# Patient Record
Sex: FEMALE | Race: BLACK OR AFRICAN AMERICAN | Employment: FULL TIME | URBAN - METROPOLITAN AREA
[De-identification: names, ages, dates, MRNs, and addresses within clinical notes are randomized per-mention and may not be internally consistent; named-entity substitution may affect disease eponyms.]

---

## 2022-02-18 ENCOUNTER — APPOINTMENT (OUTPATIENT)
Dept: GENERAL RADIOLOGY | Age: 57
DRG: 291 | End: 2022-02-18
Attending: EMERGENCY MEDICINE
Payer: COMMERCIAL

## 2022-02-18 ENCOUNTER — HOSPITAL ENCOUNTER (INPATIENT)
Age: 57
LOS: 3 days | Discharge: HOME | DRG: 291 | End: 2022-02-21
Attending: EMERGENCY MEDICINE | Admitting: HOSPITALIST
Payer: COMMERCIAL

## 2022-02-18 ENCOUNTER — APPOINTMENT (OUTPATIENT)
Dept: CT IMAGING | Age: 57
DRG: 291 | End: 2022-02-18
Attending: EMERGENCY MEDICINE
Payer: COMMERCIAL

## 2022-02-18 DIAGNOSIS — I16.9 HYPERTENSIVE CRISIS: ICD-10-CM

## 2022-02-18 DIAGNOSIS — I50.9 NEW ONSET OF CONGESTIVE HEART FAILURE (HCC): Primary | ICD-10-CM

## 2022-02-18 DIAGNOSIS — R77.8 ELEVATED TROPONIN: ICD-10-CM

## 2022-02-18 PROBLEM — I50.23 SYSTOLIC CHF, ACUTE ON CHRONIC (HCC): Status: ACTIVE | Noted: 2022-02-18

## 2022-02-18 LAB
AMPHET UR QL SCN: NEGATIVE
ANION GAP SERPL CALC-SCNC: 8 MMOL/L (ref 3–18)
APPEARANCE UR: CLEAR
ATRIAL RATE: 111 BPM
BACTERIA URNS QL MICRO: ABNORMAL /HPF
BARBITURATES UR QL SCN: NEGATIVE
BASOPHILS # BLD: 0 K/UL (ref 0–0.1)
BASOPHILS NFR BLD: 0 % (ref 0–2)
BENZODIAZ UR QL: NEGATIVE
BILIRUB UR QL: NEGATIVE
BNP SERPL-MCNC: 1007 PG/ML (ref 0–900)
BUN SERPL-MCNC: 15 MG/DL (ref 7–18)
BUN/CREAT SERPL: 22 (ref 12–20)
CALCIUM SERPL-MCNC: 9.7 MG/DL (ref 8.5–10.1)
CALCULATED P AXIS, ECG09: 66 DEGREES
CALCULATED R AXIS, ECG10: 8 DEGREES
CALCULATED T AXIS, ECG11: 20 DEGREES
CANNABINOIDS UR QL SCN: NEGATIVE
CHLORIDE SERPL-SCNC: 109 MMOL/L (ref 100–111)
CK SERPL-CCNC: 147 U/L (ref 26–192)
CO2 SERPL-SCNC: 23 MMOL/L (ref 21–32)
COCAINE UR QL SCN: NEGATIVE
COLOR UR: YELLOW
CREAT SERPL-MCNC: 0.68 MG/DL (ref 0.6–1.3)
DIAGNOSIS, 93000: NORMAL
DIFFERENTIAL METHOD BLD: ABNORMAL
EOSINOPHIL # BLD: 0.1 K/UL (ref 0–0.4)
EOSINOPHIL NFR BLD: 1 % (ref 0–5)
EPITH CASTS URNS QL MICRO: ABNORMAL /LPF (ref 0–5)
ERYTHROCYTE [DISTWIDTH] IN BLOOD BY AUTOMATED COUNT: 15.4 % (ref 11.6–14.5)
GLUCOSE SERPL-MCNC: 116 MG/DL (ref 74–99)
GLUCOSE UR STRIP.AUTO-MCNC: NEGATIVE MG/DL
HCG SERPL QL: NEGATIVE
HCT VFR BLD AUTO: 38.9 % (ref 35–45)
HDSCOM,HDSCOM: NORMAL
HGB BLD-MCNC: 12 G/DL (ref 12–16)
HGB UR QL STRIP: NEGATIVE
IMM GRANULOCYTES # BLD AUTO: 0 K/UL (ref 0–0.04)
IMM GRANULOCYTES NFR BLD AUTO: 0 % (ref 0–0.5)
KETONES UR QL STRIP.AUTO: NEGATIVE MG/DL
LEUKOCYTE ESTERASE UR QL STRIP.AUTO: ABNORMAL
LYMPHOCYTES # BLD: 1.9 K/UL (ref 0.9–3.6)
LYMPHOCYTES NFR BLD: 24 % (ref 21–52)
MCH RBC QN AUTO: 27 PG (ref 24–34)
MCHC RBC AUTO-ENTMCNC: 30.8 G/DL (ref 31–37)
MCV RBC AUTO: 87.6 FL (ref 78–100)
METHADONE UR QL: NEGATIVE
MONOCYTES # BLD: 0.6 K/UL (ref 0.05–1.2)
MONOCYTES NFR BLD: 8 % (ref 3–10)
NEUTS SEG # BLD: 5.2 K/UL (ref 1.8–8)
NEUTS SEG NFR BLD: 66 % (ref 40–73)
NITRITE UR QL STRIP.AUTO: NEGATIVE
NRBC # BLD: 0 K/UL (ref 0–0.01)
NRBC BLD-RTO: 0 PER 100 WBC
OPIATES UR QL: NEGATIVE
P-R INTERVAL, ECG05: 150 MS
PCP UR QL: NEGATIVE
PH UR STRIP: 7.5 [PH] (ref 5–8)
PLATELET # BLD AUTO: 291 K/UL (ref 135–420)
PMV BLD AUTO: 11.4 FL (ref 9.2–11.8)
POTASSIUM SERPL-SCNC: 3.5 MMOL/L (ref 3.5–5.5)
PROT UR STRIP-MCNC: ABNORMAL MG/DL
Q-T INTERVAL, ECG07: 388 MS
QRS DURATION, ECG06: 90 MS
QTC CALCULATION (BEZET), ECG08: 527 MS
RBC # BLD AUTO: 4.44 M/UL (ref 4.2–5.3)
RBC #/AREA URNS HPF: NEGATIVE /HPF (ref 0–5)
SODIUM SERPL-SCNC: 140 MMOL/L (ref 136–145)
SP GR UR REFRACTOMETRY: 1.02 (ref 1–1.03)
TROPONIN-HIGH SENSITIVITY: 95 NG/L (ref 0–54)
TSH SERPL DL<=0.05 MIU/L-ACNC: 2.69 UIU/ML (ref 0.36–3.74)
UROBILINOGEN UR QL STRIP.AUTO: 1 EU/DL (ref 0.2–1)
VENTRICULAR RATE, ECG03: 111 BPM
WBC # BLD AUTO: 7.8 K/UL (ref 4.6–13.2)
WBC URNS QL MICRO: ABNORMAL /HPF (ref 0–5)

## 2022-02-18 PROCEDURE — 84703 CHORIONIC GONADOTROPIN ASSAY: CPT

## 2022-02-18 PROCEDURE — 84484 ASSAY OF TROPONIN QUANT: CPT

## 2022-02-18 PROCEDURE — 83880 ASSAY OF NATRIURETIC PEPTIDE: CPT

## 2022-02-18 PROCEDURE — 74011250636 HC RX REV CODE- 250/636: Performed by: HOSPITALIST

## 2022-02-18 PROCEDURE — 80048 BASIC METABOLIC PNL TOTAL CA: CPT

## 2022-02-18 PROCEDURE — 71275 CT ANGIOGRAPHY CHEST: CPT

## 2022-02-18 PROCEDURE — 84443 ASSAY THYROID STIM HORMONE: CPT

## 2022-02-18 PROCEDURE — 81001 URINALYSIS AUTO W/SCOPE: CPT

## 2022-02-18 PROCEDURE — 74011250637 HC RX REV CODE- 250/637: Performed by: HOSPITALIST

## 2022-02-18 PROCEDURE — 65660000000 HC RM CCU STEPDOWN

## 2022-02-18 PROCEDURE — 74011250636 HC RX REV CODE- 250/636: Performed by: EMERGENCY MEDICINE

## 2022-02-18 PROCEDURE — 74011000636 HC RX REV CODE- 636: Performed by: EMERGENCY MEDICINE

## 2022-02-18 PROCEDURE — 80307 DRUG TEST PRSMV CHEM ANLYZR: CPT

## 2022-02-18 PROCEDURE — 96375 TX/PRO/DX INJ NEW DRUG ADDON: CPT

## 2022-02-18 PROCEDURE — 85025 COMPLETE CBC W/AUTO DIFF WBC: CPT

## 2022-02-18 PROCEDURE — 96374 THER/PROPH/DIAG INJ IV PUSH: CPT

## 2022-02-18 PROCEDURE — 93005 ELECTROCARDIOGRAM TRACING: CPT

## 2022-02-18 PROCEDURE — 74011250637 HC RX REV CODE- 250/637: Performed by: FAMILY MEDICINE

## 2022-02-18 PROCEDURE — 71045 X-RAY EXAM CHEST 1 VIEW: CPT

## 2022-02-18 PROCEDURE — 82550 ASSAY OF CK (CPK): CPT

## 2022-02-18 PROCEDURE — 74011250637 HC RX REV CODE- 250/637: Performed by: EMERGENCY MEDICINE

## 2022-02-18 PROCEDURE — 99285 EMERGENCY DEPT VISIT HI MDM: CPT

## 2022-02-18 RX ORDER — ACETAMINOPHEN 325 MG/1
650 TABLET ORAL
Status: DISCONTINUED | OUTPATIENT
Start: 2022-02-18 | End: 2022-02-21 | Stop reason: HOSPADM

## 2022-02-18 RX ORDER — LABETALOL HCL 20 MG/4 ML
20 SYRINGE (ML) INTRAVENOUS ONCE
Status: COMPLETED | OUTPATIENT
Start: 2022-02-18 | End: 2022-02-18

## 2022-02-18 RX ORDER — METOPROLOL TARTRATE 50 MG/1
50 TABLET ORAL EVERY 12 HOURS
Status: DISCONTINUED | OUTPATIENT
Start: 2022-02-19 | End: 2022-02-19

## 2022-02-18 RX ORDER — FUROSEMIDE 10 MG/ML
40 INJECTION INTRAMUSCULAR; INTRAVENOUS
Status: COMPLETED | OUTPATIENT
Start: 2022-02-18 | End: 2022-02-18

## 2022-02-18 RX ORDER — GUAIFENESIN 100 MG/5ML
324 LIQUID (ML) ORAL
Status: COMPLETED | OUTPATIENT
Start: 2022-02-18 | End: 2022-02-18

## 2022-02-18 RX ORDER — LISINOPRIL 5 MG/1
10 TABLET ORAL DAILY
Status: DISCONTINUED | OUTPATIENT
Start: 2022-02-18 | End: 2022-02-20

## 2022-02-18 RX ORDER — ENOXAPARIN SODIUM 100 MG/ML
40 INJECTION SUBCUTANEOUS EVERY 24 HOURS
Status: DISCONTINUED | OUTPATIENT
Start: 2022-02-18 | End: 2022-02-21 | Stop reason: HOSPADM

## 2022-02-18 RX ORDER — METOPROLOL SUCCINATE 25 MG/1
25 TABLET, EXTENDED RELEASE ORAL DAILY
Status: DISCONTINUED | OUTPATIENT
Start: 2022-02-18 | End: 2022-02-18

## 2022-02-18 RX ORDER — FUROSEMIDE 10 MG/ML
40 INJECTION INTRAMUSCULAR; INTRAVENOUS 2 TIMES DAILY
Status: DISCONTINUED | OUTPATIENT
Start: 2022-02-18 | End: 2022-02-21

## 2022-02-18 RX ADMIN — FUROSEMIDE 40 MG: 10 INJECTION, SOLUTION INTRAMUSCULAR; INTRAVENOUS at 05:29

## 2022-02-18 RX ADMIN — LISINOPRIL 10 MG: 5 TABLET ORAL at 16:48

## 2022-02-18 RX ADMIN — METOPROLOL TARTRATE 50 MG: 50 TABLET, FILM COATED ORAL at 23:39

## 2022-02-18 RX ADMIN — IOPAMIDOL 75 ML: 755 INJECTION, SOLUTION INTRAVENOUS at 06:35

## 2022-02-18 RX ADMIN — METOPROLOL SUCCINATE 25 MG: 25 TABLET, EXTENDED RELEASE ORAL at 16:48

## 2022-02-18 RX ADMIN — FUROSEMIDE 40 MG: 10 INJECTION, SOLUTION INTRAMUSCULAR; INTRAVENOUS at 18:33

## 2022-02-18 RX ADMIN — LABETALOL HYDROCHLORIDE 20 MG: 5 INJECTION, SOLUTION INTRAVENOUS at 05:29

## 2022-02-18 RX ADMIN — ACETAMINOPHEN 650 MG: 325 TABLET ORAL at 23:39

## 2022-02-18 RX ADMIN — ASPIRIN 324 MG: 81 TABLET, CHEWABLE ORAL at 06:36

## 2022-02-18 NOTE — ED NOTES
Pt placed on 2L NC. Pt stated she felt weak when she walked to the bathroom. Pt given lasix and labetalol att. Pt appears sleepy and has consistent dry cough.

## 2022-02-18 NOTE — H&P
History & Physical    Patient: Aruna Tena MRN: [id number]  CSN: [id number]    YOB: 1965  Age: 49 y.o. Sex: female      DOA: 2/18/2022  Primary Care Provider:  Guido Au NP      Assessment/Plan     Patient Active Problem List   Diagnosis Code    Hypertensive crisis I16.9    Elevated troponin W91.9    Systolic CHF, acute on chronic (HCC) I50.23       Admit to monitored floor    Hypertensive crisis -  Started on low dose lisinopril and metoprolol succinate  Cautious in decreasing BP. Monitor. Acute on chronic systolic CHF -  Monitor daily weights and strict I/Os   Continue diuresis, fluid and sodium restriction - monitor renal function and electrolytes   ACE-i   beta blocker   Obtain echo, trend cardiac enzymes, check TSH & LFTs (evidence of hepatic congestion)   Admission EKG and CXR and CTA chest reviewed   No evidence of anemia or infection   BNP this admission 1007     DVT prophylaxis with lovenox      Estimated length of stay : 2-3 days    CC: SOB, Cough       HPI:     Aruna Tena is a 49 y.o. female with hypertension presents to ER with concerns of shortness of breath. Patient reports that she woke up in the middle of the night at 1 AM with cough and shortness of breath. She was also feeling hot. She denies any chest pain, palpitations, nausea, vomiting. No diaphoresis. She denies any history of heart failure in the past.  However upon reviewing records she was admitted to [facility] in 2018 for elevated blood pressure and CHF. At that time her echocardiogram showed EF of 25%. She has seen cardiology and PCP on and off. She reports she has not been taking any medications for the past 4 months. Denies any lower extremity edema. In ER she was noted to have elevated blood pressure at 190/121. She was tachycardic at 111. Her labs show elevated NT proBNP at 1007, high-sensitivity troponin at 95.   CTA chest showed possible CHF pattern with cardiomegaly, bilateral pulmonary consolidations groundglass opacities, in lobular septal thickening and small effusions. Past Medical History:   Diagnosis Date    HTN (hypertension)        No past surgical history on file. No family history on file. Social History     Socioeconomic History    Marital status: OTHER       Prior to Admission medications    Not on File       No Known Allergies    Review of Systems  Gen: No fever, chills, malaise, weight loss/gain. Heent: No headache, rhinorrhea, epistaxis, ear pain, hearing loss, sinus pain, neck pain/stiffness, sore throat. Heart: No chest pain, palpitations, IZAGUIRRE, pnd, or orthopnea. Resp: No cough, hemoptysis, wheezing and shortness of breath. GI: No nausea, vomiting, diarrhea, constipation, melena or hematochezia. : No urinary obstruction, dysuria or hematuria. Derm: No rash, new skin lesion or pruritis. Musc/skeletal: no bone or joint complains. Vasc: No edema, cyanosis or claudication. Endo: No heat/cold intolerance, no polyuria,polydipsia or polyphagia. Neuro: No unilateral weakness, numbness, tingling. No seizures. Heme: No easy bruising or bleeding. Physical Exam:     Physical Exam:  Visit Vitals  BP (!) 172/122 (BP 1 Location: Right upper arm, BP Patient Position: Sitting)   Pulse 100   Temp 98.3 °F (36.8 °C)   Resp 20   Ht 5' 7\" (1.702 m)   Wt 86.2 kg (190 lb)   SpO2 100%   BMI 29.76 kg/m²    O2 Flow Rate (L/min): 2 l/min O2 Device: Nasal cannula    Temp (24hrs), Av °F (36.7 °C), Min:97 °F (36.1 °C), Max:98.4 °F (36.9 °C)    No intake/output data recorded. No intake/output data recorded. General:  Awake, cooperative, no distress. Head:  Normocephalic, without obvious abnormality, atraumatic. Eyes:  Conjunctivae/corneas clear, sclera anicteric, PERRL, EOMs intact. Nose: Nares normal. No drainage or sinus tenderness.    Throat: Lips, mucosa, and tongue normal.    Neck: Supple, symmetrical, trachea midline, no adenopathy. Lungs:   Rales lower lungs bilaterally. Heart:   S1, S2, no murmur, click, rub or gallop. Abdomen: Soft, non-tender. Bowel sounds normal. No masses,  No organomegaly. Extremities: Extremities normal, atraumatic, no cyanosis or edema. Capillary refill normal.   Pulses: 2+ and symmetric all extremities. Skin: Skin color pink, turgor normal. No rashes or lesions   Neurologic: CNII-XII intact. No focal motor or sensory deficit. Labs Reviewed:    CMP:   Lab Results   Component Value Date/Time     02/18/2022 04:09 AM    K 3.5 02/18/2022 04:09 AM     02/18/2022 04:09 AM    CO2 23 02/18/2022 04:09 AM    AGAP 8 02/18/2022 04:09 AM     (H) 02/18/2022 04:09 AM    BUN 15 02/18/2022 04:09 AM    CREA 0.68 02/18/2022 04:09 AM    GFRAA >60 02/18/2022 04:09 AM    GFRNA >60 02/18/2022 04:09 AM    CA 9.7 02/18/2022 04:09 AM     CBC:   Lab Results   Component Value Date/Time    WBC 7.8 02/18/2022 04:09 AM    HGB 12.0 02/18/2022 04:09 AM    HCT 38.9 02/18/2022 04:09 AM     02/18/2022 04:09 AM     All Cardiac Markers in the last 24 hours:   Lab Results   Component Value Date/Time     02/18/2022 04:09 AM         Procedures/imaging: see electronic medical records for all procedures/Xrays and details which were not copied into this note but were reviewed prior to creation of Plan    Please note that this dictation was completed with [employer] the [employer] voice recognition software. Quite often unanticipated grammatical, syntax, homophones, and other interpretive errors are inadvertently transcribed by the computer software. Please disregard these errors. Please excuse any errors that have escaped final proofreading.         CC: Jus Durham NP

## 2022-02-18 NOTE — PROGRESS NOTES
Reason for Admission:  Chart reviewed; per H&P, patient is a [de-identified] 49 y.o. female with PMHX of hypertension but noncompliant with medication for several years who presents to the emergency department C/O shortness of breath and high blood pressure. \"                     RUR Score:     Low, 5%                Plan for utilizing home health:   TBD       PCP: First and Last name:  Ashly Lacy NP     Name of Practice: Family Medicine   Are you a current patient: Yes/No: yes   Approximate date of last visit: \"5-6 months ago\"   Can you participate in a virtual visit with your PCP: no                    Current Advanced Directive/Advance Care Plan: No Order      Healthcare Decision Maker:   Click here to complete [address] including selection of the Healthcare Decision Maker Relationship (ie \"Primary\")                             Transition of Care Plan:       Care manager met with patient at [phone]; per patient, she is feeling much better; currently on 2L NC. Noted cardiology consult and will continue to follow for discharge needs; verified she has home transportation and does not use any DME. Care Management Interventions  PCP Verified by CM:  Yes  Mode of Transport at Discharge: Self  Transition of Care Consult (CM Consult): Discharge Planning  Support Systems: Parent(s)  Confirm Follow Up Transport: Family  The Plan for Transition of Care is Related to the Following Treatment Goals : new CHF  The Patient and/or Patient Representative was Provided with a Choice of Provider and Agrees with the Discharge Plan?: Yes  Name of the Patient Representative Who was Provided with a Choice of Provider and Agrees with the Discharge Plan: Eduardo Mari, patient  Discharge Location  Patient Expects to be Discharged to[de-identified] Home with family assistance

## 2022-02-18 NOTE — ED PROVIDER NOTES
EMERGENCY DEPARTMENT HISTORY AND PHYSICAL EXAM    Date: 2/18/2022  Patient Name: Coco Elena    History of Presenting Illness     Chief Complaint   Patient presents with    Shortness of Breath    Hypertension         History Provided By: Patient    Additional History (Context):   6:35 AM  Coco Elena is a 49 y.o. female with PMHX of hypertension but noncompliant with medication for several years who presents to the emergency department C/O shortness of breath and high blood pressure. Although she is unaware her previous hospital visits have documented cardiomyopathy in the chart with prior prescription for Norvasc. Patient notes insidious onset of difficulty breathing but awoke tonight feeling more winded and and waking her from sleep. She has had a dry nonproductive cough without chills but feel 1 hot. There has been no fevers. She denies any sick or ill exposures. She has no shortness of breath with exertion. She denies any chest pain. She has no nausea vomiting. She has a prescription for metoprolol but has not taken in a long time. She has no family history of heart congestion heart failure heart attack or enlarged heart. She has no history of blood clots she or her family. She has had no travel and no treatment for cancers. Social History  She is a coffee user but denies use of alcohol or illegal drugs. Family History  Hypertension in her mother      PCP: Ricki Mora NP        Past History     Past Medical History:  No past medical history on file. Past Surgical History:  No past surgical history on file. Family History:  No family history on file. Social History:  Social History     Tobacco Use    Smoking status: Not on file    Smokeless tobacco: Not on file   Substance Use Topics    Alcohol use: Not on file    Drug use: Not on file       Allergies:  No Known Allergies      Review of Systems   Review of Systems   Constitutional: Negative. HENT: Negative. Eyes: Negative. Respiratory: Positive for cough. Cardiovascular: Positive for leg swelling. Gastrointestinal: Negative. Endocrine: Negative. Genitourinary: Negative. Musculoskeletal: Negative. Skin: Negative. Allergic/Immunologic: Negative. Neurological: Negative. Hematological: Negative. Psychiatric/Behavioral: Negative. All other systems reviewed and are negative. Physical Exam     Vitals:    02/18/22 0418 02/18/22 0535 02/18/22 0606 02/18/22 0641   BP:  (!) 190/121 (!) 138/94 (!) 144/93   Pulse:  88 92 96   Resp:  23 24 21   Temp:       SpO2: 93% 90%     Weight:       Height:         Physical Exam  Vitals and nursing note reviewed. Constitutional:       General: She is not in acute distress. Appearance: She is well-developed. She is not diaphoretic. HENT:      Head: Normocephalic and atraumatic. Eyes:      General: No scleral icterus. Extraocular Movements:      Right eye: Normal extraocular motion. Left eye: Normal extraocular motion. Conjunctiva/sclera: Conjunctivae normal.      Pupils: Pupils are equal, round, and reactive to light. Neck:      Trachea: No tracheal deviation. Cardiovascular:      Rate and Rhythm: Normal rate and regular rhythm. Chest Wall: PMI is displaced. Heart sounds: S1 normal and S2 normal. Murmur heard. Systolic murmur is present with a grade of 2/6. Pulmonary:      Effort: Pulmonary effort is normal. No respiratory distress. Breath sounds: No stridor. Examination of the right-lower field reveals decreased breath sounds and rales. Examination of the left-lower field reveals decreased breath sounds and rales. Decreased breath sounds and rales present. Abdominal:      General: Bowel sounds are normal. There is no distension. Palpations: Abdomen is soft. Tenderness: There is no abdominal tenderness. There is no rebound. Musculoskeletal:         General: No tenderness.  Normal range of motion. Cervical back: Normal range of motion and neck supple. Right lower le+ Pitting Edema present. Left lower le+ Pitting Edema present. Comments: Grossly unremarkable without abnormalities   Skin:     General: Skin is warm and dry. Capillary Refill: Capillary refill takes less than 2 seconds. Findings: No erythema or rash. Neurological:      Mental Status: She is alert and oriented to person, place, and time. GCS: GCS eye subscore is 4. GCS verbal subscore is 5. GCS motor subscore is 6. Cranial Nerves: No cranial nerve deficit. Motor: No weakness. Psychiatric:         Attention and Perception: Attention normal.         Mood and Affect: Mood normal.         Speech: Speech normal.         Behavior: Behavior normal.         Thought Content: Thought content normal.         Judgment: Judgment normal.       Diagnostic Study Results     Labs -  Recent Results (from the past 24 hour(s))   CBC WITH AUTOMATED DIFF    Collection Time: 22  4:09 AM   Result Value Ref Range    WBC 7.8 4.6 - 13.2 K/uL    RBC 4.44 4.20 - 5.30 M/uL    HGB 12.0 12.0 - 16.0 g/dL    HCT 38.9 35.0 - 45.0 %    MCV 87.6 78.0 - 100.0 FL    MCH 27.0 24.0 - 34.0 PG    MCHC 30.8 (L) 31.0 - 37.0 g/dL    RDW 15.4 (H) 11.6 - 14.5 %    PLATELET 375 700 - 819 K/uL    MPV 11.4 9.2 - 11.8 FL    NRBC 0.0 0  WBC    ABSOLUTE NRBC 0.00 0.00 - 0.01 K/uL    NEUTROPHILS 66 40 - 73 %    LYMPHOCYTES 24 21 - 52 %    MONOCYTES 8 3 - 10 %    EOSINOPHILS 1 0 - 5 %    BASOPHILS 0 0 - 2 %    IMMATURE GRANULOCYTES 0 0.0 - 0.5 %    ABS. NEUTROPHILS 5.2 1.8 - 8.0 K/UL    ABS. LYMPHOCYTES 1.9 0.9 - 3.6 K/UL    ABS. MONOCYTES 0.6 0.05 - 1.2 K/UL    ABS. EOSINOPHILS 0.1 0.0 - 0.4 K/UL    ABS. BASOPHILS 0.0 0.0 - 0.1 K/UL    ABS. IMM.  GRANS. 0.0 0.00 - 0.04 K/UL    DF AUTOMATED     METABOLIC PANEL, BASIC    Collection Time: 22  4:09 AM   Result Value Ref Range    Sodium 140 136 - 145 mmol/L    Potassium 3.5 3.5 - 5.5 mmol/L    Chloride 109 100 - 111 mmol/L    CO2 23 21 - 32 mmol/L    Anion gap 8 3.0 - 18 mmol/L    Glucose 116 (H) 74 - 99 mg/dL    BUN 15 7.0 - 18 MG/DL    Creatinine 0.68 0.6 - 1.3 MG/DL    BUN/Creatinine ratio 22 (H) 12 - 20      GFR est AA >60 >60 ml/min/1.73m2    GFR est non-AA >60 >60 ml/min/1.73m2    Calcium 9.7 8.5 - 10.1 MG/DL   NT-PRO BNP    Collection Time: 02/18/22  4:09 AM   Result Value Ref Range    NT pro-BNP 1,007 (H) 0 - 900 PG/ML   TROPONIN-HIGH SENSITIVITY    Collection Time: 02/18/22  4:09 AM   Result Value Ref Range    Troponin-High Sensitivity 95 (HH) 0 - 54 ng/L   CK    Collection Time: 02/18/22  4:09 AM   Result Value Ref Range     26 - 192 U/L   TSH 3RD GENERATION    Collection Time: 02/18/22  4:09 AM   Result Value Ref Range    TSH 2.69 0.36 - 3.74 uIU/mL   HCG QL SERUM    Collection Time: 02/18/22  4:09 AM   Result Value Ref Range    HCG, Ql. Negative NEG     URINALYSIS W/ RFLX MICROSCOPIC    Collection Time: 02/18/22  5:27 AM   Result Value Ref Range    Color YELLOW      Appearance CLEAR      Specific gravity 1.017 1.005 - 1.030      pH (UA) 7.5 5.0 - 8.0      Protein TRACE (A) NEG mg/dL    Glucose Negative NEG mg/dL    Ketone Negative NEG mg/dL    Bilirubin Negative NEG      Blood Negative NEG      Urobilinogen 1.0 0.2 - 1.0 EU/dL    Nitrites Negative NEG      Leukocyte Esterase SMALL (A) NEG     DRUG SCREEN, URINE    Collection Time: 02/18/22  5:27 AM   Result Value Ref Range    BENZODIAZEPINES Negative NEG      BARBITURATES Negative NEG      THC (TH-CANNABINOL) Negative NEG      OPIATES Negative NEG      PCP(PHENCYCLIDINE) Negative NEG      COCAINE Negative NEG      AMPHETAMINES Negative NEG      METHADONE Negative NEG      HDSCOM (NOTE)    URINE MICROSCOPIC ONLY    Collection Time: 02/18/22  5:27 AM   Result Value Ref Range    WBC 4 to 10 0 - 5 /hpf    RBC Negative 0 - 5 /hpf    Epithelial cells 2+ 0 - 5 /lpf    Bacteria 1+ (A) NEG /hpf        Radiologic Studies - Preliminary findings  Cardiomegaly with pulmonary edema noted right side greater than left. There is no visible effusion. Final radiology report pending  Sofía Mcdowell MD    CTA CHEST W OR W WO CONT   Final Result      1. No pulmonary embolism identified. 2. Possible CHF pattern with cardiomegaly, bilateral pulmonary consolidations,   groundglass opacities, interlobular septal thickening, and small effusions. Pneumonia is also possible in the appropriate clinical setting. XR CHEST PORT   Final Result      Possible CHF with cardiomegaly, interstitial edema, and patchy pulmonary   opacities. Pneumonia is also possible in the appropriate clinical setting. CT Results  (Last 48 hours)               02/18/22 0635  CTA CHEST W OR W WO CONT Final result    Impression:      1. No pulmonary embolism identified. 2. Possible CHF pattern with cardiomegaly, bilateral pulmonary consolidations,   groundglass opacities, interlobular septal thickening, and small effusions. Pneumonia is also possible in the appropriate clinical setting. Narrative:  EXAM: CTA chest       CLINICAL INDICATION/HISTORY: Respiratory distress       COMPARISON: None       TECHNIQUE: Axial CT imaging from the thoracic inlet through the diaphragm with   intravenous contrast. Coronal and sagittal MIP reformats were generated. One or   more dose reduction techniques were used on this CT: automated exposure control,   adjustment of the mAs and/or kVp according to patient size, and iterative   reconstruction techniques. The specific techniques used on this CT exam have   been documented in the patient's electronic medical record.  Digital Imaging and   Communications in Medicine (DICOM) format image data are available to   nonaffiliated external healthcare facilities or entities on a secured, media   free, reciprocally searchable basis with patient authorization for at least a   12-month period after this study.           _______________       FINDINGS:       EXAM QUALITY: Adequate       PULMONARY ARTERIES: No pulmonary embolism identified. MEDIASTINUM: Heart is enlarged. Aorta is unremarkable. No pericardial effusion. LUNGS: Patchy bilateral pulmonary consolidations and groundglass opacities with   basilar predominant interlobular septal thickening. No suspicious nodules or   masses. AIRWAY: Normal.       PLEURA: Small pleural effusions. LYMPH NODES: No enlarged nodes. UPPER ABDOMEN: Hepatic steatosis. BONES: No acute or aggressive osseous abnormalities identified. OTHER: None.       _______________               CXR Results  (Last 48 hours)               02/18/22 0506  XR CHEST PORT Final result    Impression:      Possible CHF with cardiomegaly, interstitial edema, and patchy pulmonary   opacities. Pneumonia is also possible in the appropriate clinical setting. Narrative:  EXAM: CHEST RADIOGRAPH       CLINICAL INDICATION/HISTORY: SOB/HTN/tachy     > Additional: None       COMPARISON: None       TECHNIQUE: Portable frontal view of the chest       _______________       FINDINGS:       SUPPORT DEVICES: None. HEART AND MEDIASTINUM: Heart is enlarged. LUNGS AND PLEURAL SPACES: Patchy bilateral pulmonary opacities and interstitial   edema. No pneumothorax. BONES AND SOFT TISSUES: Unremarkable.       _______________                 Medications given in the ED-  Medications   furosemide (LASIX) injection 40 mg (40 mg IntraVENous Given 2/18/22 0529)   labetaloL (NORMODYNE;TRANDATE) 20 mg/4 mL (5 mg/mL) injection 20 mg (20 mg IntraVENous Given 2/18/22 0529)   iopamidoL (ISOVUE-370) 76 % injection 1-75 mL (75 mL IntraVENous Given 2/18/22 0635)   aspirin chewable tablet 324 mg (324 mg Oral Given 2/18/22 0636)         Medical Decision Making   I am the first provider for this patient.     I reviewed the vital signs, available nursing notes, past medical history, past surgical history, family history and social history. Vital Signs-Reviewed the patient's vital signs. Pulse Oximetry Analysis -90 to 93 % on room air    Cardiac Monitor:  Rate: 108 bpm  Rhythm: Sinus tach    EKG interpretation: (Preliminary)  3:54 AM   Sinus tachycardia, rate 111, positive LVH, there is a visible strain pattern but otherwise no STEMI. QTC is 527. EKG read by Claudette Erb, MD      Records Reviewed: NURSING NOTES AND PREVIOUS MEDICAL RECORDS    Provider Notes (Medical Decision Making):   Patient with hypertension shortness of breath and some evidence of endorgan damage. She has CHF and elevated troponin. This could be a troponin leak or early ACS. Her EKG does not look STEMI to me. Pulmonary embolism should be considered. Blood work was sent finding elevated troponin elevated BNP but no other electrolyte abnormalities including renal function. Urine was negative for stimulants as a contributing factor. She had a clean UDS. Will consult with her cardiology team and asked them to assist with management. We will also ask medicine to admit. She responded well to beta-blocker and Lasix. Aspirin given. Procedures:  Procedures    ED Course:   4:45 AM: Initial assessment performed. The patients presenting problems have been discussed, and they are in agreement with the care plan formulated and outlined with them. I have encouraged them to ask questions as they arise throughout their visit. CONSULT NOTE:   6:45 AM  Claudette Erb, MD   spoke with Dr. Destinee Torres  Specialty: Cardiology  Discussed pt's hx, disposition, and available diagnostic and imaging results  over the telephone. Reviewed care plans. Consulting physician agrees with plans as outlined. We reviewed the case at length. We will obtain echo as well as CTA to evaluate for pulmonary embolism and heart failure valvular heart disease or dysmotility.   Would not recommend starting heparin or Lovenox anticoagulation only give aspirin. Should CTA prove positive for PE and of course would start anticoagulation. CONSULT NOTE:   7:33 AM  Nilda Rivas MD   spoke with Dr. Saskia Chavez   Specialty: hospitalist  Discussed pt's hx, disposition, and available diagnostic and imaging results  over the telephone. Reviewed care plans. Consulting physician agrees with plans as outlined. Will accept to telemetry. Diagnosis and Disposition     Critical Care Time: 45 minutes  CRITICAL CARE NOTE:  7:02 AM  I have spent 45 minutes of critical care time involved in lab review, consultations with specialist, family decision-making, and documentation. During this entire length of time I was immediately available to the patient. Critical Care: The reason for providing this level of medical care rosalindor this critically ill patient was due a critical illness that impaired one or more vital organ systems such that there was a high probability of imminent or life threatening deterioration in the patients condition. This care involved high complexity decision making to assess, manipulate, and support vital system functions, to treat this degreee vital organ system failure and to prevent further life threatening deterioration of the patients condition. Core Measures:  For Hospitalized Patients:    1. Hospitalization Decision Time:  The decision to hospitalize the patient was made by Nilda Rivas MD   at 5:30 AM on 2/18/2022    2. Aspirin: Aspirin was given    7:01 AM  Patient is being admitted to the hospital by Dr. Fernando Pringle. The results of their tests and reasons for their admission have been discussed with them and/or available family. They convey agreement and understanding for the need to be admitted and for their admission diagnosis. CONDITIONS ON ADMISSION:  Sepsis is not present at the time of admission. Deep Vein Thrombosis is not present at the time of admission. Thrombosis is not present at the time of admission. Urinary Tract Infection is not present at the time of admission. Pneumonia is not present at the time of admission. MRSA is not present at the time of admission. Wound infection is not present at the time of admission. Pressure Ulcer is not present at the time of admission. CLINICAL IMPRESSION:    1. New onset of congestive heart failure ([address]    2. Hypertensive crisis    3. Elevated troponin          _______________________________    This note was partially transcribed via voice recognition software. Although efforts have been made to catch any discrepancies, it may contain sound alike words, grammatical errors, or nonsensical words.

## 2022-02-18 NOTE — ED NOTES
Pt arrived with c/o \"breathing heavy\" and waking up feeling \"very hot\". Pt presents with history of hypertension but has not been taking her daily BP medication. Pt BP is elevated. Pt states she does not have history of asthma, CHF or COPD but woke up and was having difficulty breathing. Pt denies CP, HA, N/V/D. Pt endorses a dry cough x3 days. Pt states she had a negative covid test 1 month ago. Pt has been vaccinated against covid. Pt is alert and oriented x4, vital signs are stable. Pt in NAD. Pt call bell in reach.

## 2022-02-19 LAB
ALBUMIN SERPL-MCNC: 3.4 G/DL (ref 3.4–5)
ALBUMIN/GLOB SERPL: 0.8 {RATIO} (ref 0.8–1.7)
ALP SERPL-CCNC: 100 U/L (ref 45–117)
ALT SERPL-CCNC: 24 U/L (ref 13–56)
ANION GAP SERPL CALC-SCNC: 10 MMOL/L (ref 3–18)
AST SERPL-CCNC: 12 U/L (ref 10–38)
BILIRUB DIRECT SERPL-MCNC: 0.1 MG/DL (ref 0–0.2)
BILIRUB SERPL-MCNC: 0.6 MG/DL (ref 0.2–1)
BUN SERPL-MCNC: 17 MG/DL (ref 7–18)
BUN/CREAT SERPL: 18 (ref 12–20)
CALCIUM SERPL-MCNC: 9.9 MG/DL (ref 8.5–10.1)
CHLORIDE SERPL-SCNC: 106 MMOL/L (ref 100–111)
CO2 SERPL-SCNC: 25 MMOL/L (ref 21–32)
CREAT SERPL-MCNC: 0.94 MG/DL (ref 0.6–1.3)
ERYTHROCYTE [DISTWIDTH] IN BLOOD BY AUTOMATED COUNT: 15.5 % (ref 11.6–14.5)
GLOBULIN SER CALC-MCNC: 4.1 G/DL (ref 2–4)
GLUCOSE SERPL-MCNC: 180 MG/DL (ref 74–99)
HCT VFR BLD AUTO: 37.1 % (ref 35–45)
HGB BLD-MCNC: 11.3 G/DL (ref 12–16)
MCH RBC QN AUTO: 27.4 PG (ref 24–34)
MCHC RBC AUTO-ENTMCNC: 30.5 G/DL (ref 31–37)
MCV RBC AUTO: 89.8 FL (ref 78–100)
NRBC # BLD: 0 K/UL (ref 0–0.01)
NRBC BLD-RTO: 0 PER 100 WBC
PLATELET # BLD AUTO: 306 K/UL (ref 135–420)
PMV BLD AUTO: 11.5 FL (ref 9.2–11.8)
POTASSIUM SERPL-SCNC: 3.9 MMOL/L (ref 3.5–5.5)
PROT SERPL-MCNC: 7.5 G/DL (ref 6.4–8.2)
RBC # BLD AUTO: 4.13 M/UL (ref 4.2–5.3)
SODIUM SERPL-SCNC: 141 MMOL/L (ref 136–145)
WBC # BLD AUTO: 9.3 K/UL (ref 4.6–13.2)

## 2022-02-19 PROCEDURE — 99232 SBSQ HOSP IP/OBS MODERATE 35: CPT | Performed by: INTERNAL MEDICINE

## 2022-02-19 PROCEDURE — 80076 HEPATIC FUNCTION PANEL: CPT

## 2022-02-19 PROCEDURE — 74011250637 HC RX REV CODE- 250/637: Performed by: INTERNAL MEDICINE

## 2022-02-19 PROCEDURE — 74011250637 HC RX REV CODE- 250/637: Performed by: HOSPITALIST

## 2022-02-19 PROCEDURE — 85027 COMPLETE CBC AUTOMATED: CPT

## 2022-02-19 PROCEDURE — 80048 BASIC METABOLIC PNL TOTAL CA: CPT

## 2022-02-19 PROCEDURE — 36415 COLL VENOUS BLD VENIPUNCTURE: CPT

## 2022-02-19 PROCEDURE — 65660000000 HC RM CCU STEPDOWN

## 2022-02-19 PROCEDURE — 74011250636 HC RX REV CODE- 250/636: Performed by: HOSPITALIST

## 2022-02-19 PROCEDURE — 74011250637 HC RX REV CODE- 250/637: Performed by: FAMILY MEDICINE

## 2022-02-19 RX ORDER — ASPIRIN 81 MG/1
81 TABLET ORAL DAILY
Status: DISCONTINUED | OUTPATIENT
Start: 2022-02-19 | End: 2022-02-21 | Stop reason: HOSPADM

## 2022-02-19 RX ORDER — AMLODIPINE BESYLATE 5 MG/1
5 TABLET ORAL DAILY
Status: DISCONTINUED | OUTPATIENT
Start: 2022-02-19 | End: 2022-02-21

## 2022-02-19 RX ORDER — METOPROLOL SUCCINATE 50 MG/1
50 TABLET, EXTENDED RELEASE ORAL DAILY
Status: DISCONTINUED | OUTPATIENT
Start: 2022-02-19 | End: 2022-02-21 | Stop reason: HOSPADM

## 2022-02-19 RX ADMIN — AMLODIPINE BESYLATE 5 MG: 5 TABLET ORAL at 11:26

## 2022-02-19 RX ADMIN — ASPIRIN 81 MG: 81 TABLET, COATED ORAL at 08:49

## 2022-02-19 RX ADMIN — FUROSEMIDE 40 MG: 10 INJECTION, SOLUTION INTRAMUSCULAR; INTRAVENOUS at 08:51

## 2022-02-19 RX ADMIN — ACETAMINOPHEN 650 MG: 325 TABLET ORAL at 09:02

## 2022-02-19 RX ADMIN — METOPROLOL SUCCINATE 50 MG: 50 TABLET, EXTENDED RELEASE ORAL at 08:49

## 2022-02-19 RX ADMIN — ENOXAPARIN SODIUM 40 MG: 100 INJECTION SUBCUTANEOUS at 17:00

## 2022-02-19 RX ADMIN — LISINOPRIL 10 MG: 5 TABLET ORAL at 08:49

## 2022-02-19 RX ADMIN — FUROSEMIDE 40 MG: 10 INJECTION, SOLUTION INTRAMUSCULAR; INTRAVENOUS at 21:59

## 2022-02-19 NOTE — CONSULTS
[facility] Consult Note      Patient: C[facility] MRN: [id number]  SSN: [id number]    YOB: 1965  Age: 49 y.o. Sex: female    Date of Consultation: 02/28/2022  Referring Physician: Yesi Rosen  Reason for Consultation: CHF    Chief complain: Shortness of breath on exertion     HPI:  56-year-old female came to emergency with complaining of shortness of breath. She mentioned that she woke up from the sleep with cough and shortness of breath. She has been complaining of shortness of breath on exertion for last few days. Shortness of breath relieved by rest.  She denies any orthopnea PND. She denies any leg swelling. Denies any chest pain. Denies any dizziness, palpitation, presyncope or syncope. Patient has known systolic heart failure but not taking any medications. She mentioned that medication makes her feel bad. On ER arrival blood pressure was elevated and she was tachycardia. Currently she is not in any distress. Cardiology consult called for evaluation of CHF. Past Medical History:   Diagnosis Date    HTN (hypertension)      No past surgical history on file. Current Facility-Administered Medications   Medication Dose Route Frequency    metoprolol succinate (TOPROL-XL) XL tablet 50 mg  50 mg Oral DAILY    furosemide (LASIX) injection 40 mg  40 mg IntraVENous BID    lisinopriL (PRINIVIL, ZESTRIL) tablet 10 mg  10 mg Oral DAILY    enoxaparin (LOVENOX) injection 40 mg  40 mg SubCUTAneous Q24H    acetaminophen (TYLENOL) tablet 650 mg  650 mg Oral Q6H PRN       Allergies and Intolerances:   No Known Allergies    Family History:   No family history on file. Social History:   She  has no history on file for tobacco use. She  has no history on file for alcohol use. Review of Systems:     Gen: No fever, chills, malaise, weight loss/gain. Heent: No headache, rhinorrhea, epistaxis, ear pain, hearing loss, sinus pain, neck pain/stiffness, sore throat.    Heart: Positive shortness of breath on exertion, No chest pain, palpitations, pnd, or orthopnea. Resp: No cough, hemoptysis, wheezing and dyspnea  GI: No nausea, vomiting, diarrhea, constipation, melena or hematochezia. : No urinary obstruction, dysuria or hematuria. Derm: No rash, new skin lesion or pruritis. Musc/skeletal: positive bone or joint complains. Vasc: No edema, cyanosis or claudication. Endo: No heat/cold intolerance, no polyuria,polydipsia or polyphagia. Neuro: No unilateral weakness, numbness, tingling. No seizures. Heme: No easy bruising or bleeding. Physical:   Patient Vitals for the past 6 hrs:   Temp Pulse Resp BP SpO2   02/19/22 0131 98.6 °F (37 °C) 80 20 (!) 133/91 100 %   02/18/22 2116 98.5 °F (36.9 °C) (!) 103 20 (!) 174/131 100 %         Exam:   General Appearance: Comfortable, not using accessory muscles of respiration. HEENT: TRACI. HEAD: Atraumatic  NECK: No JVD, no thyroidomeglay. CAROTIDS: No bruit  LUNGS: Clear bilaterally. HEART: S1+S2 audible, no murmur, no pericardial rub. ABD: Non-tender, BS Audible    EXT: No edema, and no cyanosis. VASCULAR EXAM: Pulses are intact. PSYCHIATRIC EXAM: Mood is appropriate. MUSCULOSKELETAL: Grossly no joint deformity.   NEUROLOGICAL: AAO times 3, Motor and sensory sytem intact     Review of Data:   LABS:   Lab Results   Component Value Date/Time    WBC 9.3 02/19/2022 01:48 AM    HGB 11.3 (L) 02/19/2022 01:48 AM    HCT 37.1 02/19/2022 01:48 AM    PLATELET 395 98/57/9975 01:48 AM     Lab Results   Component Value Date/Time    Sodium 141 02/19/2022 01:48 AM    Potassium 3.9 02/19/2022 01:48 AM    Chloride 106 02/19/2022 01:48 AM    CO2 25 02/19/2022 01:48 AM    Glucose 180 (H) 02/19/2022 01:48 AM    BUN 17 02/19/2022 01:48 AM    Creatinine 0.94 02/19/2022 01:48 AM     No results found for: CHOL, CHOLX, CHLST, CHOLV, HDL, HDLP, LDL, LDLC, DLDLP, TGLX, TRIGL, TRIGP  No components found for: GPT  No results found for: HBA1C, FWB0IWDW, VRL2MWWI      Cardiology Procedures:   Results for orders placed or performed during the hospital encounter of 02/18/22   EKG, 12 LEAD, INITIAL   Result Value Ref Range    Ventricular Rate 111 BPM    Atrial Rate 111 BPM    P-R Interval 150 ms    QRS Duration 90 ms    Q-T Interval 388 ms    QTC Calculation (Bezet) 527 ms    Calculated P Axis 66 degrees    Calculated R Axis 8 degrees    Calculated T Axis 20 degrees    Diagnosis       Poor data quality, interpretation may be adversely affected  Sinus tachycardia  Voltage criteria for left ventricular hypertrophy ( R in aVL , Sokolow-Mary ,   Theodore product )  Poor R Wave Progression  Prolonged QT  Abnormal ECG  Confirmed by Oziel Schmid MD, Shannon Paulino ([id number]) on 2/18/2022 2:25:53 PM             Impression / Plan:    Patient Active Problem List   Diagnosis Code    Hypertensive crisis I16.9    Elevated troponin Q16.1    Systolic CHF, acute on chronic (HCC) I50.23     Medication non adherence    Echocardiogram was done in 08/2018 reported     Left Ventricle: Dilated with normal left ventricular wall thickness.    Left Ventricle Severe global hypokinesis - LVEF estimated in the 25% range    Function:    LVEF: 25 %     Plan:    Start aspirin 81 mg by mouth once a day. Serial EKG with cardiac enzymes Q 6-8 hours x3     Change metoprolol tartrate to metoprolol succinate 50 mg by mouth once a day, continue lisinopril 40 mg once a day. Continue IV Lasix. Echocardiogram.    Discussed with patient about medication adherence. Advised about salt restriction up to 2 g per day and fluid restriction up to 1.2 L per day.           Signed By: Verner Manners, MD     February 19, 2022

## 2022-02-19 NOTE — PROGRESS NOTES
Hospitalist Progress Note    Patient: Meredith Garcia MRN: [id number]  CSN: [id number]    YOB: 1965  Age: 49 y.o. Sex: female    DOA: 2/18/2022 LOS:  LOS: 1 day            Patient Active Problem List   Diagnosis Code    Hypertensive crisis I16.9    Elevated troponin K96.0    Systolic CHF, acute on chronic ([facility]) I50.23        IMPRESSION and Plan:    Meredith Garcia is a 49 y.o. female with   Patient Active Problem List    Diagnosis Date Noted    Hypertensive crisis 02/18/2022    Elevated troponin 79/59/5791    Systolic CHF, acute on chronic ([facility] [address] 02/18/2022     Principal Problem:    Systolic CHF, acute on chronic ([facility] [address] (2/18/2022)    Active Problems:    Hypertensive crisis (2/18/2022)      Elevated troponin (2/18/2022)        Hypertensive crisis - bp is still elevated. Will add Norvasc.      Acute on chronic systolic CHF -  Cont on IV lasix  Echo pending  meds reviewed  appriciate cardiology input       DVT prophylaxis with lovenox         Patient's condition is fair    D/w pt at length and advise her that she is not safe/cleared for discharge. And caution her not to sign out AMA. Recommend to continue hospitalization. Discussed with patient. Chief Complaints:   Chief Complaint   Patient presents with    Shortness of Breath    Hypertension     SUBJECTIVE:  Pt is seen and examined. Chart reviewed    Feels better. Less sob  Anxious to go home      Review of systems:    Review of Systems   Constitutional: Positive for malaise/fatigue. Negative for chills. HENT: Negative. Eyes: Negative. Respiratory: Positive for shortness of breath. Negative for cough. Cardiovascular: Negative for chest pain, palpitations, orthopnea and leg swelling. Gastrointestinal: Negative. Negative for abdominal pain, diarrhea and heartburn. Genitourinary: Negative for dysuria and hematuria. Musculoskeletal: Positive for back pain and joint pain. Skin: Negative.     Neurological: Positive for weakness. Psychiatric/Behavioral: Negative for depression, substance abuse and suicidal ideas. The patient is nervous/anxious. PE:  Patient Vitals for the past 24 hrs:   BP Temp Pulse Resp SpO2   02/19/22 1022 (!) 141/104       02/19/22 0847 (!) 168/114 98 °F (36.7 °C) 91 18 100 %   02/19/22 0441 (!) 145/92 98.1 °F (36.7 °C) 80 20 100 %   02/19/22 0131 (!) 133/91 98.6 °F (37 °C) 80 20 100 %   02/18/22 2116 (!) 174/131 98.5 °F (36.9 °C) (!) 103 20 100 %   02/18/22 1840 (!) 174/122       02/18/22 1638 (!) 172/122 98.3 °F (36.8 °C) 100 20 100 %   02/18/22 1605 (!) 180/128       02/18/22 1229 (!) 143/94 98.4 °F (36.9 °C) 89 20 100 %     No intake or output data in the 24 hours ending 02/19/22 1038  Patient Vitals for the past 120 hrs:   Weight   02/18/22 0350 86.2 kg (190 lb)         Physical Exam  Vitals and nursing note reviewed. Constitutional:       Appearance: She is ill-appearing. Neck:      Vascular: No JVD. Cardiovascular:      Rate and Rhythm: Normal rate and regular rhythm. Heart sounds: Normal heart sounds. Pulmonary:      Effort: No respiratory distress. Breath sounds: Normal breath sounds. Abdominal:      General: Bowel sounds are normal. There is no distension. Palpations: Abdomen is soft. Tenderness: There is no abdominal tenderness. There is no rebound. Musculoskeletal:         General: Normal range of motion. Cervical back: Normal range of motion and neck supple. Skin:     General: Skin is warm and dry. Neurological:      Mental Status: She is alert and oriented to person, place, and time. Psychiatric:         Mood and Affect: Affect normal.             Intake and Output:  Current Shift:  No intake/output data recorded. Last three shifts:  No intake/output data recorded. Lab/Data Reviewed:  No results found for this or any previous visit (from the past 8 hour(s)).   Medications:  Current Facility-Administered Medications Medication Dose Route Frequency    metoprolol succinate (TOPROL-XL) XL tablet 50 mg  50 mg Oral DAILY    aspirin delayed-release tablet 81 mg  81 mg Oral DAILY    furosemide (LASIX) injection 40 mg  40 mg IntraVENous BID    lisinopriL (PRINIVIL, ZESTRIL) tablet 10 mg  10 mg Oral DAILY    enoxaparin (LOVENOX) injection 40 mg  40 mg SubCUTAneous Q24H    acetaminophen (TYLENOL) tablet 650 mg  650 mg Oral Q6H PRN       Recent Results (from the past 24 hour(s))   METABOLIC PANEL, BASIC    Collection Time: 02/19/22  1:48 AM   Result Value Ref Range    Sodium 141 136 - 145 mmol/L    Potassium 3.9 3.5 - 5.5 mmol/L    Chloride 106 100 - 111 mmol/L    CO2 25 21 - 32 mmol/L    Anion gap 10 3.0 - 18 mmol/L    Glucose 180 (H) 74 - 99 mg/dL    BUN 17 7.0 - 18 MG/DL    Creatinine 0.94 0.6 - 1.3 MG/DL    BUN/Creatinine ratio 18 12 - 20      GFR est AA >60 >60 ml/min/1.73m2    GFR est non-AA >60 >60 ml/min/1.73m2    Calcium 9.9 8.5 - 10.1 MG/DL   CBC W/O DIFF    Collection Time: 02/19/22  1:48 AM   Result Value Ref Range    WBC 9.3 4.6 - 13.2 K/uL    RBC 4.13 (L) 4.20 - 5.30 M/uL    HGB 11.3 (L) 12.0 - 16.0 g/dL    HCT 37.1 35.0 - 45.0 %    MCV 89.8 78.0 - 100.0 FL    MCH 27.4 24.0 - 34.0 PG    MCHC 30.5 (L) 31.0 - 37.0 g/dL    RDW 15.5 (H) 11.6 - 14.5 %    PLATELET 608 381 - 491 K/uL    MPV 11.5 9.2 - 11.8 FL    NRBC 0.0 0  WBC    ABSOLUTE NRBC 0.00 0.00 - 0.01 K/uL   HEPATIC FUNCTION PANEL    Collection Time: 02/19/22  1:48 AM   Result Value Ref Range    Protein, total 7.5 6.4 - 8.2 g/dL    Albumin 3.4 3.4 - 5.0 g/dL    Globulin 4.1 (H) 2.0 - 4.0 g/dL    A-G Ratio 0.8 0.8 - 1.7      Bilirubin, total 0.6 0.2 - 1.0 MG/DL    Bilirubin, direct 0.1 0.0 - 0.2 MG/DL    Alk.  phosphatase 100 45 - 117 U/L    AST (SGOT) 12 10 - 38 U/L    ALT (SGPT) 24 13 - 56 U/L       Procedures/imaging: see electronic medical records for all procedures/Xrays and details which were not copied into this note but were reviewed prior to creation of Sultana Penaloza MD   2/19/2022, 10:38 AM

## 2022-02-19 NOTE — PROGRESS NOTES
1545- When entering room, pt. Sitting on edge of bed with belongings packed. Nurse reviewed plan of care with patient. Pt. Stated \"That test was supposed to have been done. That's why I agreed to stay. They told me when I came up here, theyd get it done and I could go home. \"     Informed pt. How testing on inpatient admission worked. Pt. Stated \"Then I want to leave. No point in me staying here. Noones even been in to see me anyways. \"     Nurse called Dr. Arjun Pineda. Returned to room with Dr. Arjun Pineda on speaker phone with pt. Dr. Arjun Pineda informed her of plan of care, confirmed he did see her and she would be leaving AMA. Pt. Stated \"That's fine. I want out. Hang up on him and take this shit off\" referring to her IV. Nurse reentered room with [facility] paperwork. Pt. Then asked for blood pressure to be taken first.  Blood pressure was done manually and found to be 180/128. Nurse advised pt. To stay. She agreed. Dr. Arjun Pineda informed of pt. Decision and increased BP. Awaiting new orders. Will continue to monitor.

## 2022-02-19 NOTE — PROGRESS NOTES
Problem: Falls - Risk of  Goal: *Absence of Falls  Description: Document Derek Paris Fall Risk and appropriate interventions in the flowsheet.   Outcome: Progressing Towards Goal  Note: Fall Risk Interventions:            Medication Interventions: Teach patient to arise slowly                   Problem: Patient Education: Go to Patient Education Activity  Goal: Patient/Family Education  Outcome: Progressing Towards Goal

## 2022-02-19 NOTE — PROGRESS NOTES
Cardiology Progress Note      2/19/2022 2:03 PM    Admit Date: 2/18/2022    Admit Diagnosis: New onset of congestive heart failure ([facility] [address] [I50.9]  Hypertensive crisis [I16.9]  Elevated troponin [R77.8]      Subjective:     Jihan Coyle denies chest pain. Visit Vitals  BP (!) 152/90   Pulse 86   Temp 98 °F (36.7 °C)   Resp 19   Ht 5' 7\" (1.702 m)   Wt 86.2 kg (190 lb)   SpO2 100%   BMI 29.76 kg/m²     Current Facility-Administered Medications   Medication Dose Route Frequency    metoprolol succinate (TOPROL-XL) XL tablet 50 mg  50 mg Oral DAILY    aspirin delayed-release tablet 81 mg  81 mg Oral DAILY    amLODIPine (NORVASC) tablet 5 mg  5 mg Oral DAILY    furosemide (LASIX) injection 40 mg  40 mg IntraVENous BID    lisinopriL (PRINIVIL, ZESTRIL) tablet 10 mg  10 mg Oral DAILY    enoxaparin (LOVENOX) injection 40 mg  40 mg SubCUTAneous Q24H    acetaminophen (TYLENOL) tablet 650 mg  650 mg Oral Q6H PRN         Objective:      Physical Exam:  General Appearance: Comfortable, not using accessory muscles of respiration. HEENT: TRACI. HEAD: Atraumatic  NECK: No JVD, no thyroidomeglay. CAROTIDS: No bruit  LUNGS: Clear bilaterally. HEART: S1+S2 audible, no murmur, no pericardial rub. ABD: Non-tender, BS Audible                          EXT: No edema, and no cyanosis. VASCULAR EXAM: Pulses are intact. PSYCHIATRIC EXAM: Mood is appropriate. MUSCULOSKELETAL: Grossly no joint deformity.   NEUROLOGICAL: AAO times 3, Motor and sensory sytem intact        Data Review:   Labs:    Recent Results (from the past 24 hour(s))   METABOLIC PANEL, BASIC    Collection Time: 02/19/22  1:48 AM   Result Value Ref Range    Sodium 141 136 - 145 mmol/L    Potassium 3.9 3.5 - 5.5 mmol/L    Chloride 106 100 - 111 mmol/L    CO2 25 21 - 32 mmol/L    Anion gap 10 3.0 - 18 mmol/L    Glucose 180 (H) 74 - 99 mg/dL    BUN 17 7.0 - 18 MG/DL    Creatinine 0.94 0.6 - 1.3 MG/DL BUN/Creatinine ratio 18 12 - 20      GFR est AA >60 >60 ml/min/1.73m2    GFR est non-AA >60 >60 ml/min/1.73m2    Calcium 9.9 8.5 - 10.1 MG/DL   CBC W/O DIFF    Collection Time: 02/19/22  1:48 AM   Result Value Ref Range    WBC 9.3 4.6 - 13.2 K/uL    RBC 4.13 (L) 4.20 - 5.30 M/uL    HGB 11.3 (L) 12.0 - 16.0 g/dL    HCT 37.1 35.0 - 45.0 %    MCV 89.8 78.0 - 100.0 FL    MCH 27.4 24.0 - 34.0 PG    MCHC 30.5 (L) 31.0 - 37.0 g/dL    RDW 15.5 (H) 11.6 - 14.5 %    PLATELET 597 628 - 087 K/uL    MPV 11.5 9.2 - 11.8 FL    NRBC 0.0 0  WBC    ABSOLUTE NRBC 0.00 0.00 - 0.01 K/uL   HEPATIC FUNCTION PANEL    Collection Time: 02/19/22  1:48 AM   Result Value Ref Range    Protein, total 7.5 6.4 - 8.2 g/dL    Albumin 3.4 3.4 - 5.0 g/dL    Globulin 4.1 (H) 2.0 - 4.0 g/dL    A-G Ratio 0.8 0.8 - 1.7      Bilirubin, total 0.6 0.2 - 1.0 MG/DL    Bilirubin, direct 0.1 0.0 - 0.2 MG/DL    Alk. phosphatase 100 45 - 117 U/L    AST (SGOT) 12 10 - 38 U/L    ALT (SGPT) 24 13 - 56 U/L       Telemetry: normal sinus rhythm      Assessment:     Principal Problem:    Systolic CHF, acute on chronic (HCC) (2/18/2022)    Active Problems:    Hypertensive crisis (2/18/2022)      Elevated troponin (2/18/2022)        Plan:     Blood pressure is still elevated. Amlodipine was added already today. Continue to follow. Echo is pending.     Freddie Nava MD

## 2022-02-20 ENCOUNTER — APPOINTMENT (OUTPATIENT)
Dept: NON INVASIVE DIAGNOSTICS | Age: 57
DRG: 291 | End: 2022-02-20
Attending: HOSPITALIST
Payer: COMMERCIAL

## 2022-02-20 LAB
ALBUMIN SERPL-MCNC: 3.7 G/DL (ref 3.4–5)
ALBUMIN/GLOB SERPL: 0.9 {RATIO} (ref 0.8–1.7)
ALP SERPL-CCNC: 104 U/L (ref 45–117)
ALT SERPL-CCNC: 26 U/L (ref 13–56)
ANION GAP SERPL CALC-SCNC: 7 MMOL/L (ref 3–18)
AST SERPL-CCNC: 16 U/L (ref 10–38)
BASOPHILS # BLD: 0 K/UL (ref 0–0.1)
BASOPHILS NFR BLD: 1 % (ref 0–2)
BILIRUB SERPL-MCNC: 0.6 MG/DL (ref 0.2–1)
BNP SERPL-MCNC: 644 PG/ML (ref 0–900)
BUN SERPL-MCNC: 21 MG/DL (ref 7–18)
BUN/CREAT SERPL: 26 (ref 12–20)
CALCIUM SERPL-MCNC: 10.4 MG/DL (ref 8.5–10.1)
CHLORIDE SERPL-SCNC: 106 MMOL/L (ref 100–111)
CO2 SERPL-SCNC: 28 MMOL/L (ref 21–32)
CREAT SERPL-MCNC: 0.82 MG/DL (ref 0.6–1.3)
DIFFERENTIAL METHOD BLD: ABNORMAL
ECHO AO ASC DIAM: 3.1 CM
ECHO AO ASCENDING AORTA INDEX: 1.57 CM/M2
ECHO AO ROOT DIAM: 2.7 CM
ECHO AO ROOT INDEX: 1.36 CM/M2
ECHO AV AREA PEAK VELOCITY: 3.3 CM2
ECHO AV AREA VTI: 3.9 CM2
ECHO AV AREA/BSA PEAK VELOCITY: 1.7 CM2/M2
ECHO AV AREA/BSA VTI: 2 CM2/M2
ECHO AV MEAN GRADIENT: 3 MMHG
ECHO AV MEAN VELOCITY: 0.8 M/S
ECHO AV PEAK GRADIENT: 6 MMHG
ECHO AV PEAK VELOCITY: 1.2 M/S
ECHO AV VELOCITY RATIO: 1
ECHO AV VTI: 15.2 CM
ECHO LA DIAMETER INDEX: 1.97 CM/M2
ECHO LA DIAMETER: 3.9 CM
ECHO LA TO AORTIC ROOT RATIO: 1.44
ECHO LV E' LATERAL VELOCITY: 4 CM/S
ECHO LV E' SEPTAL VELOCITY: 4 CM/S
ECHO LV EDV A4C: 199 ML
ECHO LV EDV INDEX A4C: 101 ML/M2
ECHO LV EJECTION FRACTION A4C: 34 %
ECHO LV ESV A4C: 132 ML
ECHO LV ESV INDEX A4C: 67 ML/M2
ECHO LV FRACTIONAL SHORTENING: 11 % (ref 28–44)
ECHO LV INTERNAL DIMENSION DIASTOLE INDEX: 3.18 CM/M2
ECHO LV INTERNAL DIMENSION DIASTOLIC: 6.3 CM (ref 3.9–5.3)
ECHO LV INTERNAL DIMENSION SYSTOLIC INDEX: 2.83 CM/M2
ECHO LV INTERNAL DIMENSION SYSTOLIC: 5.6 CM
ECHO LV IVSD: 1 CM (ref 0.6–0.9)
ECHO LV MASS 2D: 321.8 G (ref 67–162)
ECHO LV MASS INDEX 2D: 162.5 G/M2 (ref 43–95)
ECHO LV POSTERIOR WALL DIASTOLIC: 1.3 CM (ref 0.6–0.9)
ECHO LV RELATIVE WALL THICKNESS RATIO: 0.41
ECHO LVOT AREA: 3.1 CM2
ECHO LVOT AV VTI INDEX: 1.2
ECHO LVOT DIAM: 2 CM
ECHO LVOT MEAN GRADIENT: 3 MMHG
ECHO LVOT PEAK GRADIENT: 6 MMHG
ECHO LVOT PEAK VELOCITY: 1.2 M/S
ECHO LVOT STROKE VOLUME INDEX: 28.9 ML/M2
ECHO LVOT SV: 57.1 ML
ECHO LVOT VTI: 18.2 CM
ECHO MV A VELOCITY: 0.84 M/S
ECHO MV AREA VTI: 3.4 CM2
ECHO MV E DECELERATION TIME (DT): 148.8 MS
ECHO MV E VELOCITY: 0.78 M/S
ECHO MV E/A RATIO: 0.93
ECHO MV E/E' LATERAL: 19.5
ECHO MV E/E' RATIO (AVERAGED): 19.5
ECHO MV E/E' SEPTAL: 19.5
ECHO MV LVOT VTI INDEX: 0.93
ECHO MV MAX VELOCITY: 1 M/S
ECHO MV MEAN GRADIENT: 2 MMHG
ECHO MV MEAN VELOCITY: 0.7 M/S
ECHO MV PEAK GRADIENT: 4 MMHG
ECHO MV REGURGITANT PEAK GRADIENT: 121 MMHG
ECHO MV REGURGITANT PEAK VELOCITY: 5.5 M/S
ECHO MV VTI: 17 CM
EOSINOPHIL # BLD: 0.1 K/UL (ref 0–0.4)
EOSINOPHIL NFR BLD: 2 % (ref 0–5)
ERYTHROCYTE [DISTWIDTH] IN BLOOD BY AUTOMATED COUNT: 15.5 % (ref 11.6–14.5)
GLOBULIN SER CALC-MCNC: 4.3 G/DL (ref 2–4)
GLUCOSE SERPL-MCNC: 121 MG/DL (ref 74–99)
HCT VFR BLD AUTO: 41 % (ref 35–45)
HGB BLD-MCNC: 12.3 G/DL (ref 12–16)
IMM GRANULOCYTES # BLD AUTO: 0 K/UL (ref 0–0.04)
IMM GRANULOCYTES NFR BLD AUTO: 0 % (ref 0–0.5)
LYMPHOCYTES # BLD: 2.2 K/UL (ref 0.9–3.6)
LYMPHOCYTES NFR BLD: 25 % (ref 21–52)
MAGNESIUM SERPL-MCNC: 2.3 MG/DL (ref 1.6–2.6)
MCH RBC QN AUTO: 27.6 PG (ref 24–34)
MCHC RBC AUTO-ENTMCNC: 30 G/DL (ref 31–37)
MCV RBC AUTO: 91.9 FL (ref 78–100)
MONOCYTES # BLD: 0.8 K/UL (ref 0.05–1.2)
MONOCYTES NFR BLD: 10 % (ref 3–10)
NEUTS SEG # BLD: 5.3 K/UL (ref 1.8–8)
NEUTS SEG NFR BLD: 62 % (ref 40–73)
NRBC # BLD: 0 K/UL (ref 0–0.01)
NRBC BLD-RTO: 0 PER 100 WBC
PHOSPHATE SERPL-MCNC: 3.4 MG/DL (ref 2.5–4.9)
PLATELET # BLD AUTO: 291 K/UL (ref 135–420)
PMV BLD AUTO: 11.6 FL (ref 9.2–11.8)
POTASSIUM SERPL-SCNC: 4.5 MMOL/L (ref 3.5–5.5)
PROT SERPL-MCNC: 8 G/DL (ref 6.4–8.2)
RBC # BLD AUTO: 4.46 M/UL (ref 4.2–5.3)
SODIUM SERPL-SCNC: 141 MMOL/L (ref 136–145)
WBC # BLD AUTO: 8.5 K/UL (ref 4.6–13.2)

## 2022-02-20 PROCEDURE — 93306 TTE W/DOPPLER COMPLETE: CPT

## 2022-02-20 PROCEDURE — 36415 COLL VENOUS BLD VENIPUNCTURE: CPT

## 2022-02-20 PROCEDURE — 85025 COMPLETE CBC W/AUTO DIFF WBC: CPT

## 2022-02-20 PROCEDURE — 83880 ASSAY OF NATRIURETIC PEPTIDE: CPT

## 2022-02-20 PROCEDURE — 74011250637 HC RX REV CODE- 250/637: Performed by: FAMILY MEDICINE

## 2022-02-20 PROCEDURE — 80053 COMPREHEN METABOLIC PANEL: CPT

## 2022-02-20 PROCEDURE — 83735 ASSAY OF MAGNESIUM: CPT

## 2022-02-20 PROCEDURE — 74011250637 HC RX REV CODE- 250/637: Performed by: HOSPITALIST

## 2022-02-20 PROCEDURE — 74011250637 HC RX REV CODE- 250/637: Performed by: INTERNAL MEDICINE

## 2022-02-20 PROCEDURE — 65660000000 HC RM CCU STEPDOWN

## 2022-02-20 PROCEDURE — 74011250636 HC RX REV CODE- 250/636: Performed by: HOSPITALIST

## 2022-02-20 PROCEDURE — 99232 SBSQ HOSP IP/OBS MODERATE 35: CPT | Performed by: INTERNAL MEDICINE

## 2022-02-20 PROCEDURE — 84100 ASSAY OF PHOSPHORUS: CPT

## 2022-02-20 RX ORDER — LISINOPRIL 20 MG/1
20 TABLET ORAL DAILY
Status: DISCONTINUED | OUTPATIENT
Start: 2022-02-21 | End: 2022-02-21 | Stop reason: HOSPADM

## 2022-02-20 RX ORDER — LISINOPRIL 5 MG/1
10 TABLET ORAL ONCE
Status: COMPLETED | OUTPATIENT
Start: 2022-02-20 | End: 2022-02-20

## 2022-02-20 RX ADMIN — FUROSEMIDE 40 MG: 10 INJECTION, SOLUTION INTRAMUSCULAR; INTRAVENOUS at 21:07

## 2022-02-20 RX ADMIN — LISINOPRIL 10 MG: 5 TABLET ORAL at 08:54

## 2022-02-20 RX ADMIN — ASPIRIN 81 MG: 81 TABLET, COATED ORAL at 08:54

## 2022-02-20 RX ADMIN — LISINOPRIL 10 MG: 5 TABLET ORAL at 12:38

## 2022-02-20 RX ADMIN — AMLODIPINE BESYLATE 5 MG: 5 TABLET ORAL at 08:54

## 2022-02-20 RX ADMIN — METOPROLOL SUCCINATE 50 MG: 50 TABLET, EXTENDED RELEASE ORAL at 08:54

## 2022-02-20 RX ADMIN — ENOXAPARIN SODIUM 40 MG: 100 INJECTION SUBCUTANEOUS at 18:10

## 2022-02-20 RX ADMIN — ACETAMINOPHEN 650 MG: 325 TABLET ORAL at 21:11

## 2022-02-20 RX ADMIN — FUROSEMIDE 40 MG: 10 INJECTION, SOLUTION INTRAMUSCULAR; INTRAVENOUS at 08:55

## 2022-02-20 NOTE — PROGRESS NOTES
Hospitalist Progress Note    Patient: Gage Leyva MRN: [id number]  CSN: [id number]    YOB: 1965  Age: 49 y.o. Sex: female    DOA: 2/18/2022 LOS:  LOS: 2 days            Patient Active Problem List   Diagnosis Code    Hypertensive crisis I16.9    Elevated troponin V34.5    Systolic CHF, acute on chronic ([facility]) I50.23        IMPRESSION and Plan:    Gage Leyva is a 49 y.o. female with   Patient Active Problem List    Diagnosis Date Noted    Hypertensive crisis 02/18/2022    Elevated troponin 80/11/8881    Systolic CHF, acute on chronic ([facility] [address] 02/18/2022     Principal Problem:    Systolic CHF, acute on chronic ([facility] [address] (2/18/2022)    Active Problems:    Hypertensive crisis (2/18/2022)      Elevated troponin (2/18/2022)        Hypertensive crisis - bp is still elevated. Norvasc started yesterday. Will increase Lisinopril     Acute on chronic systolic CHF -  Cont on IV lasix per cardiology  BNP is improved  Echo pending  meds reviewed  appriciate cardiology input       DVT prophylaxis with lovenox         Patient's condition is fair    D/w pt at length and advise her that she is still  not safe/cleared for discharge. Answered all questions      Recommend to continue hospitalization. Discussed with patient. Chief Complaints:   Chief Complaint   Patient presents with    Shortness of Breath    Hypertension     SUBJECTIVE:  Pt is seen and examined. Feels better. Less sob     Somewhat frustrated -- \" things are not getting done quickly\"       Review of systems:    Review of Systems   Constitutional: Positive for malaise/fatigue. Negative for chills. HENT: Negative. Eyes: Negative. Respiratory: Positive for shortness of breath. Negative for cough. Cardiovascular: Negative for chest pain, palpitations, orthopnea and leg swelling. Gastrointestinal: Negative. Negative for abdominal pain, diarrhea and heartburn. Genitourinary: Negative for dysuria and hematuria. Musculoskeletal: Positive for joint pain. Negative for back pain. Skin: Negative. Neurological: Positive for weakness. Psychiatric/Behavioral: Negative for depression, substance abuse and suicidal ideas. The patient is nervous/anxious. PE:  Patient Vitals for the past 24 hrs:   BP Temp Pulse Resp SpO2 Height Weight   02/20/22 0934 (!) 151/106     5' 7\" (1.702 m) 86.2 kg (190 lb)   02/20/22 0800 (!) 151/106         02/20/22 0758 (!) 155/109 97.9 °F (36.6 °C) 88 17 100 %     02/20/22 0543 121/71 98.1 °F (36.7 °C) 84 18 100 %     02/20/22 0028 (!) 143/98 97.5 °F (36.4 °C) 90 18 100 %     02/19/22 2113 (!) 146/99 97.4 °F (36.3 °C) 84 18 100 %     02/19/22 2000     99 %     02/19/22 1650 (!) 134/97   18      02/19/22 1502 (!) 145/97 98.2 °F (36.8 °C) 74 17 100 %     02/19/22 1301 (!) 152/90         02/19/22 1125 (!) 143/92  86 19 100 %         Intake/Output Summary (Last 24 hours) at 2/20/2022 1056  Last data filed at 2/19/2022 1650  Gross per 24 hour   Intake 540 ml   Output    Net 540 ml     Patient Vitals for the past 120 hrs:   Weight   02/18/22 0350 86.2 kg (190 lb)   02/20/22 0934 86.2 kg (190 lb)         Physical Exam  Vitals and nursing note reviewed. Constitutional:       Appearance: She is ill-appearing. Neck:      Vascular: No JVD. Cardiovascular:      Rate and Rhythm: Normal rate and regular rhythm. Heart sounds: Normal heart sounds. Pulmonary:      Effort: No respiratory distress. Breath sounds: Normal breath sounds. Abdominal:      General: Bowel sounds are normal. There is no distension. Palpations: Abdomen is soft. Tenderness: There is no abdominal tenderness. There is no rebound. Musculoskeletal:         General: Normal range of motion. Cervical back: Normal range of motion and neck supple. Skin:     General: Skin is warm and dry.    Neurological:      Mental Status: She is alert and oriented to person, place, and time. Psychiatric:         Mood and Affect: Affect normal.             Intake and Output:  Current Shift:  No intake/output data recorded.   Last three shifts:  02/18 1901 - 02/20 0700  In: 900 [P.O.:900]  Out: -     Lab/Data Reviewed:  Recent Results (from the past 8 hour(s))   ECHO ADULT COMPLETE    Collection Time: 02/20/22 10:41 AM   Result Value Ref Range    IVSd 1.0 (A) 0.6 - 0.9 cm    LVIDd 6.3 (A) 3.9 - 5.3 cm    LVIDs 5.6 cm    LVOT Diameter 2.0 cm    LVPWd 1.3 (A) 0.6 - 0.9 cm    LVOT SV 50.7 ml    LV Ejection Fraction A4C 34 %    LV EDV A4C 199 mL    LV ESV A4C 132 mL    LVOT SV 67.3 ml    LVOT Peak Gradient 6 mmHg    LVOT Mean Gradient 3 mmHg    LVOT SV 59.6 ml    LVOT SV 59.6 ml    LVOT Peak Velocity 1.2 m/s    LVOT VTI 18.2 cm    LA Diameter 3.9 cm    AV Area by Peak Velocity 3.3 cm2    AV Area by Peak Velocity 3.3 cm2    AV Area by VTI 3.9 cm2    AV Area by VTI 3.9 cm2    AV Peak Gradient 6 mmHg    AV Mean Gradient 3 mmHg    AV Peak Velocity 1.2 m/s    AV Mean Velocity 0.8 m/s    AV VTI 15.2 cm    MV A Velocity 0.84 m/s    MV E Wave Deceleration Time 148.8 ms    MV E Velocity 0.78 m/s    LV E' Lateral Velocity 4 cm/s    LV E' Septal Velocity 4 cm/s    MV Area by VTI 3.5 cm2    MV Area by VTI 3.5 cm2    MR Peak Gradient 119 mmHg    MR Peak Velocity 5.5 m/s    MV Peak Gradient 4 mmHg    MV Mean Gradient 2 mmHg    MV Max Velocity 1.0 m/s    MV Mean Velocity 0.7 m/s    MV VTI 17.0 cm    Ascending Aorta 3.1 cm    Aortic Root 2.7 cm     Medications:  Current Facility-Administered Medications   Medication Dose Route Frequency    metoprolol succinate (TOPROL-XL) XL tablet 50 mg  50 mg Oral DAILY    aspirin delayed-release tablet 81 mg  81 mg Oral DAILY    amLODIPine (NORVASC) tablet 5 mg  5 mg Oral DAILY    furosemide (LASIX) injection 40 mg  40 mg IntraVENous BID    lisinopriL (PRINIVIL, ZESTRIL) tablet 10 mg  10 mg Oral DAILY    enoxaparin (LOVENOX) injection 40 mg  40 mg SubCUTAneous Q24H    acetaminophen (TYLENOL) tablet 650 mg  650 mg Oral Q6H PRN       Recent Results (from the past 24 hour(s))   CBC WITH AUTOMATED DIFF    Collection Time: 02/20/22  2:41 AM   Result Value Ref Range    WBC 8.5 4.6 - 13.2 K/uL    RBC 4.46 4.20 - 5.30 M/uL    HGB 12.3 12.0 - 16.0 g/dL    HCT 41.0 35.0 - 45.0 %    MCV 91.9 78.0 - 100.0 FL    MCH 27.6 24.0 - 34.0 PG    MCHC 30.0 (L) 31.0 - 37.0 g/dL    RDW 15.5 (H) 11.6 - 14.5 %    PLATELET 591 677 - 852 K/uL    MPV 11.6 9.2 - 11.8 FL    NRBC 0.0 0  WBC    ABSOLUTE NRBC 0.00 0.00 - 0.01 K/uL    NEUTROPHILS 62 40 - 73 %    LYMPHOCYTES 25 21 - 52 %    MONOCYTES 10 3 - 10 %    EOSINOPHILS 2 0 - 5 %    BASOPHILS 1 0 - 2 %    IMMATURE GRANULOCYTES 0 0.0 - 0.5 %    ABS. NEUTROPHILS 5.3 1.8 - 8.0 K/UL    ABS. LYMPHOCYTES 2.2 0.9 - 3.6 K/UL    ABS. MONOCYTES 0.8 0.05 - 1.2 K/UL    ABS. EOSINOPHILS 0.1 0.0 - 0.4 K/UL    ABS. BASOPHILS 0.0 0.0 - 0.1 K/UL    ABS. IMM. GRANS. 0.0 0.00 - 0.04 K/UL    DF AUTOMATED     MAGNESIUM    Collection Time: 02/20/22  2:41 AM   Result Value Ref Range    Magnesium 2.3 1.6 - 2.6 mg/dL   METABOLIC PANEL, COMPREHENSIVE    Collection Time: 02/20/22  2:41 AM   Result Value Ref Range    Sodium 141 136 - 145 mmol/L    Potassium 4.5 3.5 - 5.5 mmol/L    Chloride 106 100 - 111 mmol/L    CO2 28 21 - 32 mmol/L    Anion gap 7 3.0 - 18 mmol/L    Glucose 121 (H) 74 - 99 mg/dL    BUN 21 (H) 7.0 - 18 MG/DL    Creatinine 0.82 0.6 - 1.3 MG/DL    BUN/Creatinine ratio 26 (H) 12 - 20      GFR est AA >60 >60 ml/min/1.73m2    GFR est non-AA >60 >60 ml/min/1.73m2    Calcium 10.4 (H) 8.5 - 10.1 MG/DL    Bilirubin, total 0.6 0.2 - 1.0 MG/DL    ALT (SGPT) 26 13 - 56 U/L    AST (SGOT) 16 10 - 38 U/L    Alk.  phosphatase 104 45 - 117 U/L    Protein, total 8.0 6.4 - 8.2 g/dL    Albumin 3.7 3.4 - 5.0 g/dL    Globulin 4.3 (H) 2.0 - 4.0 g/dL    A-G Ratio 0.9 0.8 - 1.7     NT-PRO BNP    Collection Time: 02/20/22  2:41 AM   Result Value Ref Range    NT pro- 0 - 900 PG/ML PHOSPHORUS    Collection Time: 02/20/22  2:41 AM   Result Value Ref Range    Phosphorus 3.4 2.5 - 4.9 MG/DL   ECHO ADULT COMPLETE    Collection Time: 02/20/22 10:41 AM   Result Value Ref Range    IVSd 1.0 (A) 0.6 - 0.9 cm    LVIDd 6.3 (A) 3.9 - 5.3 cm    LVIDs 5.6 cm    LVOT Diameter 2.0 cm    LVPWd 1.3 (A) 0.6 - 0.9 cm    LVOT SV 50.7 ml    LV Ejection Fraction A4C 34 %    LV EDV A4C 199 mL    LV ESV A4C 132 mL    LVOT SV 67.3 ml    LVOT Peak Gradient 6 mmHg    LVOT Mean Gradient 3 mmHg    LVOT SV 59.6 ml    LVOT SV 59.6 ml    LVOT Peak Velocity 1.2 m/s    LVOT VTI 18.2 cm    LA Diameter 3.9 cm    AV Area by Peak Velocity 3.3 cm2    AV Area by Peak Velocity 3.3 cm2    AV Area by VTI 3.9 cm2    AV Area by VTI 3.9 cm2    AV Peak Gradient 6 mmHg    AV Mean Gradient 3 mmHg    AV Peak Velocity 1.2 m/s    AV Mean Velocity 0.8 m/s    AV VTI 15.2 cm    MV A Velocity 0.84 m/s    MV E Wave Deceleration Time 148.8 ms    MV E Velocity 0.78 m/s    LV E' Lateral Velocity 4 cm/s    LV E' Septal Velocity 4 cm/s    MV Area by VTI 3.5 cm2    MV Area by VTI 3.5 cm2    MR Peak Gradient 119 mmHg    MR Peak Velocity 5.5 m/s    MV Peak Gradient 4 mmHg    MV Mean Gradient 2 mmHg    MV Max Velocity 1.0 m/s    MV Mean Velocity 0.7 m/s    MV VTI 17.0 cm    Ascending Aorta 3.1 cm    Aortic Root 2.7 cm       Procedures/imaging: see electronic medical records for all procedures/Xrays and details which were not copied into this note but were reviewed prior to creation of Krishna Sherman MD   2/20/2022, 10:38 AM

## 2022-02-20 NOTE — PROGRESS NOTES
Alert and oriented x4, assessments and VS completed, pt has reports of pain, medication provided, BP assessed and reported to provider, intake monitored.

## 2022-02-20 NOTE — PROGRESS NOTES
Cardiology Progress Note      2/20/2022 2:03 PM    Admit Date: 2/18/2022    Admit Diagnosis: New onset of congestive heart failure ([facility] [address] [I50.9]  Hypertensive crisis [I16.9]  Elevated troponin [R77.8]      Subjective:     Jihan Coyle denies chest pain. Visit Vitals  BP (!) 151/102   Pulse 81   Temp 98 °F (36.7 °C)   Resp 16   Ht 5' 7\" (1.702 m)   Wt 86.2 kg (190 lb)   SpO2 100%   BMI 29.76 kg/m²     Current Facility-Administered Medications   Medication Dose Route Frequency    [START ON 2/21/2022] lisinopriL (PRINIVIL, ZESTRIL) tablet 20 mg  20 mg Oral DAILY    metoprolol succinate (TOPROL-XL) XL tablet 50 mg  50 mg Oral DAILY    aspirin delayed-release tablet 81 mg  81 mg Oral DAILY    amLODIPine (NORVASC) tablet 5 mg  5 mg Oral DAILY    furosemide (LASIX) injection 40 mg  40 mg IntraVENous BID    enoxaparin (LOVENOX) injection 40 mg  40 mg SubCUTAneous Q24H    acetaminophen (TYLENOL) tablet 650 mg  650 mg Oral Q6H PRN         Objective:      Physical Exam:  General Appearance: Comfortable, not using accessory muscles of respiration. HEENT: TRACI. HEAD: Atraumatic  NECK: No JVD, no thyroidomeglay. CAROTIDS: No bruit  LUNGS: Clear bilaterally. HEART: S1+S2 audible, no murmur, no pericardial rub. ABD: Non-tender, BS Audible                          EXT: No edema, and no cyanosis. VASCULAR EXAM: Pulses are intact. PSYCHIATRIC EXAM: Mood is appropriate. MUSCULOSKELETAL: Grossly no joint deformity.   NEUROLOGICAL: AAO times 3, Motor and sensory sytem intact        Data Review:   Labs:    Recent Results (from the past 24 hour(s))   CBC WITH AUTOMATED DIFF    Collection Time: 02/20/22  2:41 AM   Result Value Ref Range    WBC 8.5 4.6 - 13.2 K/uL    RBC 4.46 4.20 - 5.30 M/uL    HGB 12.3 12.0 - 16.0 g/dL    HCT 41.0 35.0 - 45.0 %    MCV 91.9 78.0 - 100.0 FL    MCH 27.6 24.0 - 34.0 PG    MCHC 30.0 (L) 31.0 - 37.0 g/dL    RDW 15.5 (H) 11.6 - 14.5 % PLATELET 246 333 - 194 K/uL    MPV 11.6 9.2 - 11.8 FL    NRBC 0.0 0  WBC    ABSOLUTE NRBC 0.00 0.00 - 0.01 K/uL    NEUTROPHILS 62 40 - 73 %    LYMPHOCYTES 25 21 - 52 %    MONOCYTES 10 3 - 10 %    EOSINOPHILS 2 0 - 5 %    BASOPHILS 1 0 - 2 %    IMMATURE GRANULOCYTES 0 0.0 - 0.5 %    ABS. NEUTROPHILS 5.3 1.8 - 8.0 K/UL    ABS. LYMPHOCYTES 2.2 0.9 - 3.6 K/UL    ABS. MONOCYTES 0.8 0.05 - 1.2 K/UL    ABS. EOSINOPHILS 0.1 0.0 - 0.4 K/UL    ABS. BASOPHILS 0.0 0.0 - 0.1 K/UL    ABS. IMM. GRANS. 0.0 0.00 - 0.04 K/UL    DF AUTOMATED     MAGNESIUM    Collection Time: 02/20/22  2:41 AM   Result Value Ref Range    Magnesium 2.3 1.6 - 2.6 mg/dL   METABOLIC PANEL, COMPREHENSIVE    Collection Time: 02/20/22  2:41 AM   Result Value Ref Range    Sodium 141 136 - 145 mmol/L    Potassium 4.5 3.5 - 5.5 mmol/L    Chloride 106 100 - 111 mmol/L    CO2 28 21 - 32 mmol/L    Anion gap 7 3.0 - 18 mmol/L    Glucose 121 (H) 74 - 99 mg/dL    BUN 21 (H) 7.0 - 18 MG/DL    Creatinine 0.82 0.6 - 1.3 MG/DL    BUN/Creatinine ratio 26 (H) 12 - 20      GFR est AA >60 >60 ml/min/1.73m2    GFR est non-AA >60 >60 ml/min/1.73m2    Calcium 10.4 (H) 8.5 - 10.1 MG/DL    Bilirubin, total 0.6 0.2 - 1.0 MG/DL    ALT (SGPT) 26 13 - 56 U/L    AST (SGOT) 16 10 - 38 U/L    Alk.  phosphatase 104 45 - 117 U/L    Protein, total 8.0 6.4 - 8.2 g/dL    Albumin 3.7 3.4 - 5.0 g/dL    Globulin 4.3 (H) 2.0 - 4.0 g/dL    A-G Ratio 0.9 0.8 - 1.7     NT-PRO BNP    Collection Time: 02/20/22  2:41 AM   Result Value Ref Range    NT pro- 0 - 900 PG/ML   PHOSPHORUS    Collection Time: 02/20/22  2:41 AM   Result Value Ref Range    Phosphorus 3.4 2.5 - 4.9 MG/DL   ECHO ADULT COMPLETE    Collection Time: 02/20/22  9:48 AM   Result Value Ref Range    IVSd 1.0 (A) 0.6 - 0.9 cm    LVIDd 6.3 (A) 3.9 - 5.3 cm    LVIDs 5.6 cm    LVOT Diameter 2.0 cm    LVPWd 1.3 (A) 0.6 - 0.9 cm    LVOT SV 57.1 ml    LV Ejection Fraction A4C 34 %    LV EDV A4C 199 mL    LV ESV A4C 132 mL    LVOT Peak Gradient 6 mmHg    LVOT Mean Gradient 3 mmHg    LVOT Peak Velocity 1.2 m/s    LVOT VTI 18.2 cm    LA Diameter 3.9 cm    AV Area by Peak Velocity 3.3 cm2    AV Area by VTI 3.9 cm2    AV Peak Gradient 6 mmHg    AV Mean Gradient 3 mmHg    AV Peak Velocity 1.2 m/s    AV Mean Velocity 0.8 m/s    AV VTI 15.2 cm    MV A Velocity 0.84 m/s    MV E Wave Deceleration Time 148.8 ms    MV E Velocity 0.78 m/s    LV E' Lateral Velocity 4 cm/s    LV E' Septal Velocity 4 cm/s    MV Area by VTI 3.4 cm2    MR Peak Gradient 121 mmHg    MR Peak Velocity 5.5 m/s    MV Peak Gradient 4 mmHg    MV Mean Gradient 2 mmHg    MV Max Velocity 1.0 m/s    MV Mean Velocity 0.7 m/s    MV VTI 17.0 cm    Ascending Aorta 3.1 cm    Aortic Root 2.7 cm    Fractional Shortening 2D 11 28 - 44 %    LV ESV Index A4C 67 mL/m2    LV EDV Index A4C 101 mL/m2    LVIDd Index 3.18 cm/m2    LVIDs Index 2.83 cm/m2    LV RWT Ratio 0.41     LV Mass 2D 321.8 (A) 67 - 162 g    LV Mass 2D Index 162.5 (A) 43 - 95 g/m2    MV E/A 0.93     E/E' Ratio (Averaged) 19.50     E/E' Lateral 19.50     E/E' Septal 19.50     LVOT Stroke Volume Index 28.9 mL/m2    LVOT Area 3.1 cm2    LA Size Index 1.97 cm/m2    LA/AO Root Ratio 1.44     Ao Root Index 1.36 cm/m2    Ascending Aorta Index 1.57 cm/m2    AV Velocity Ratio 1.00     LVOT:AV VTI Index 1.20     MV:LVOT VTI Index 0.93     PRINCE/BSA VTI 2.0 cm2/m2    PRINCE/BSA Peak Velocity 1.7 cm2/m2       Telemetry: normal sinus rhythm      Assessment:     Principal Problem:    Systolic CHF, acute on chronic (HCC) (2/18/2022)    Active Problems:    Hypertensive crisis (2/18/2022)      Elevated troponin (2/18/2022)        Plan:     Blood pressure is still elevated. Amlodipine was added already today. Continue to follow. Echo was done a little earlier. Will review. Dr. Dc Grey returns tomorrow.           Sree Campuzano MD

## 2022-02-21 VITALS
HEIGHT: 67 IN | HEART RATE: 85 BPM | TEMPERATURE: 98.2 F | RESPIRATION RATE: 16 BRPM | BODY MASS INDEX: 29.82 KG/M2 | OXYGEN SATURATION: 100 % | SYSTOLIC BLOOD PRESSURE: 124 MMHG | WEIGHT: 190 LBS | DIASTOLIC BLOOD PRESSURE: 74 MMHG

## 2022-02-21 PROBLEM — I16.9 HYPERTENSIVE CRISIS: Status: RESOLVED | Noted: 2022-02-18 | Resolved: 2022-02-21

## 2022-02-21 LAB
ALBUMIN SERPL-MCNC: 3.4 G/DL (ref 3.4–5)
ALBUMIN/GLOB SERPL: 0.8 {RATIO} (ref 0.8–1.7)
ALP SERPL-CCNC: 102 U/L (ref 45–117)
ALT SERPL-CCNC: 25 U/L (ref 13–56)
ANION GAP SERPL CALC-SCNC: 7 MMOL/L (ref 3–18)
AST SERPL-CCNC: 13 U/L (ref 10–38)
BASOPHILS # BLD: 0 K/UL (ref 0–0.1)
BASOPHILS NFR BLD: 1 % (ref 0–2)
BILIRUB SERPL-MCNC: 0.7 MG/DL (ref 0.2–1)
BUN SERPL-MCNC: 21 MG/DL (ref 7–18)
BUN/CREAT SERPL: 29 (ref 12–20)
CALCIUM SERPL-MCNC: 10.5 MG/DL (ref 8.5–10.1)
CHLORIDE SERPL-SCNC: 105 MMOL/L (ref 100–111)
CO2 SERPL-SCNC: 25 MMOL/L (ref 21–32)
CREAT SERPL-MCNC: 0.73 MG/DL (ref 0.6–1.3)
DIFFERENTIAL METHOD BLD: ABNORMAL
EOSINOPHIL # BLD: 0.1 K/UL (ref 0–0.4)
EOSINOPHIL NFR BLD: 1 % (ref 0–5)
ERYTHROCYTE [DISTWIDTH] IN BLOOD BY AUTOMATED COUNT: 15.3 % (ref 11.6–14.5)
GLOBULIN SER CALC-MCNC: 4.2 G/DL (ref 2–4)
GLUCOSE SERPL-MCNC: 117 MG/DL (ref 74–99)
HCT VFR BLD AUTO: 39.9 % (ref 35–45)
HGB BLD-MCNC: 12.2 G/DL (ref 12–16)
IMM GRANULOCYTES # BLD AUTO: 0 K/UL (ref 0–0.04)
IMM GRANULOCYTES NFR BLD AUTO: 0 % (ref 0–0.5)
LYMPHOCYTES # BLD: 2.1 K/UL (ref 0.9–3.6)
LYMPHOCYTES NFR BLD: 28 % (ref 21–52)
MAGNESIUM SERPL-MCNC: 2.4 MG/DL (ref 1.6–2.6)
MCH RBC QN AUTO: 27 PG (ref 24–34)
MCHC RBC AUTO-ENTMCNC: 30.6 G/DL (ref 31–37)
MCV RBC AUTO: 88.3 FL (ref 78–100)
MONOCYTES # BLD: 0.6 K/UL (ref 0.05–1.2)
MONOCYTES NFR BLD: 9 % (ref 3–10)
NEUTS SEG # BLD: 4.5 K/UL (ref 1.8–8)
NEUTS SEG NFR BLD: 61 % (ref 40–73)
NRBC # BLD: 0 K/UL (ref 0–0.01)
NRBC BLD-RTO: 0 PER 100 WBC
PLATELET # BLD AUTO: 299 K/UL (ref 135–420)
PMV BLD AUTO: 11.3 FL (ref 9.2–11.8)
POTASSIUM SERPL-SCNC: 4.1 MMOL/L (ref 3.5–5.5)
PROT SERPL-MCNC: 7.6 G/DL (ref 6.4–8.2)
RBC # BLD AUTO: 4.52 M/UL (ref 4.2–5.3)
SODIUM SERPL-SCNC: 137 MMOL/L (ref 136–145)
WBC # BLD AUTO: 7.4 K/UL (ref 4.6–13.2)

## 2022-02-21 PROCEDURE — 85025 COMPLETE CBC W/AUTO DIFF WBC: CPT

## 2022-02-21 PROCEDURE — 74011250637 HC RX REV CODE- 250/637: Performed by: INTERNAL MEDICINE

## 2022-02-21 PROCEDURE — 80053 COMPREHEN METABOLIC PANEL: CPT

## 2022-02-21 PROCEDURE — 36415 COLL VENOUS BLD VENIPUNCTURE: CPT

## 2022-02-21 PROCEDURE — 74011250636 HC RX REV CODE- 250/636: Performed by: HOSPITALIST

## 2022-02-21 PROCEDURE — 83735 ASSAY OF MAGNESIUM: CPT

## 2022-02-21 RX ORDER — ASPIRIN 81 MG/1
81 TABLET ORAL DAILY
Qty: 30 TABLET | Refills: 2 | Status: SHIPPED | OUTPATIENT
Start: 2022-02-22

## 2022-02-21 RX ORDER — LISINOPRIL 20 MG/1
20 TABLET ORAL DAILY
Qty: 30 TABLET | Refills: 2 | Status: SHIPPED | OUTPATIENT
Start: 2022-02-22

## 2022-02-21 RX ORDER — FUROSEMIDE 10 MG/ML
40 INJECTION INTRAMUSCULAR; INTRAVENOUS DAILY
Status: DISCONTINUED | OUTPATIENT
Start: 2022-02-22 | End: 2022-02-21 | Stop reason: HOSPADM

## 2022-02-21 RX ORDER — FUROSEMIDE 40 MG/1
40 TABLET ORAL DAILY
Qty: 30 TABLET | Refills: 2 | Status: SHIPPED | OUTPATIENT
Start: 2022-02-21

## 2022-02-21 RX ORDER — METOPROLOL SUCCINATE 50 MG/1
50 TABLET, EXTENDED RELEASE ORAL DAILY
Qty: 30 TABLET | Refills: 2 | Status: SHIPPED | OUTPATIENT
Start: 2022-02-22

## 2022-02-21 RX ADMIN — AMLODIPINE BESYLATE 5 MG: 5 TABLET ORAL at 08:43

## 2022-02-21 RX ADMIN — FUROSEMIDE 40 MG: 10 INJECTION, SOLUTION INTRAMUSCULAR; INTRAVENOUS at 08:43

## 2022-02-21 RX ADMIN — LISINOPRIL 20 MG: 20 TABLET ORAL at 08:43

## 2022-02-21 RX ADMIN — METOPROLOL SUCCINATE 50 MG: 50 TABLET, EXTENDED RELEASE ORAL at 08:43

## 2022-02-21 RX ADMIN — ASPIRIN 81 MG: 81 TABLET, COATED ORAL at 08:43

## 2022-02-21 NOTE — PROGRESS NOTES
conducted an initial consultation and Spiritual Assessment for Celeste Jansen, who is a 49 y.o.,female. Patient's Primary Language is: [place]. According to the patient's EMR Worship Affiliation is: No preference. The reason the Patient came to the hospital is:   Patient Active Problem List    Diagnosis Date Noted    Hypertensive crisis 02/18/2022    Elevated troponin 91/47/3485    Systolic CHF, acute on chronic ([facility] [address] 02/18/2022        The  provided the following Interventions:  Initiated a relationship of care and support. Explored issues of maggie, belief, spirituality and Judaism/ritual needs while hospitalized. Listened empathically. Offered prayer and assurance of continued prayers on patient's behalf. The following outcomes where achieved:  Patient shared limited information about both their medical narrative and spiritual journey/beliefs.  confirmed Patient's Worship Affiliation. Patient expressed gratitude for 's visit. Assessment:  Patient does not have any Judaism/cultural needs that will affect patient's preferences in health care. There are no spiritual or Judaism issues which require intervention at this time. Plan:  Chaplains will continue to follow and will provide pastoral care on an as needed/requested basis.  recommends bedside caregivers page  on duty if patient shows signs of acute spiritual or emotional distress.     [address]

## 2022-02-21 NOTE — PROGRESS NOTES
Cardiology Progress Note        Patient: Marycruz De Oliveira        Sex: female          DOA: 2/18/2022  YOB: 1965      Age:  49 y.o.        LOS:  LOS: 3 days      Patient seen and examined, chart reviewed. Assessment/Plan     Patient Active Problem List   Diagnosis Code    Hypertensive crisis I16.9    Elevated troponin I40.9    Systolic CHF, acute on chronic (HCC) I50.23      Medication non adherence     Echocardiogram was done in 08/2018 reported      Left Ventricle: Dilated with normal left ventricular wall thickness.    Left Ventricle Severe global hypokinesis - LVEF estimated in the 25% range    Function:    LVEF: 25 %     Echocardiogram done yesterday reported     Left Ventricle: Left ventricle is dilated. Normal wall thickness. Global hypokinesis present. Reduced left ventricular systolic function with a visually estimated EF of 30 - 35%. Grade I diastolic dysfunction with normal LAP.       Plan:     Start aspirin 81 mg by mouth once a day. Continue metoprolol tartrate to metoprolol succinate 50 mg by mouth once a day, continue lisinopril    discontinue amlodipine. continue furosemide   add spironolactone can be done as an outpatient    Advised about salt restriction up to 2 g per day and fluid restriction up to 1.2 L per day.    follow-up with primary care provider in 3 days. Follow-up with cardiologist in 2-4 weeks. Strongly advised about medication adherence  Plan discussed with                Subjective:    cc:   my breathing is better, I want to go home    REVIEW OF SYSTEMS:     General: No fevers or chills. Cardiovascular: No chest pain,No palpitations, No orthopnea, No PND, No leg swelling, No claudication  Pulmonary: No  dyspnea.    Gastrointestinal: No nausea, vomiting, bleeding  Neurology: No Dizziness    Objective:      Visit Vitals  /74   Pulse 85   Temp 98.2 °F (36.8 °C)   Resp 16   Ht 5' 7\" (1.702 m)   Wt 86.2 kg (190 lb) SpO2 100%   BMI 29.76 kg/m²     Body mass index is 29.76 kg/m². Physical Exam:  General Appearance: Comfortable, not using accessory muscles of respiration. HEENT: TRACI. HEAD: Atraumatic  NECK: No JVD, no thyroidomeglay. CAROTIDS: no bruit  LUNGS: Clear bilaterally. HEART: S1+S2 audible, no murmur, no pericardial rub. ABD: Non-tender, BS Audible    EXT: No edema, and no cyanosis. VASCULAR EXAM: Pulses are intact. PSYCHIATRIC EXAM: Mood is appropriate. MUSCULOSKELETAL: Grossly no joint deformity.   NEUROLOGICAL: AAO times 3, No motor and sensory deficit    Medication:  Current Facility-Administered Medications   Medication Dose Route Frequency    [START ON 2/22/2022] furosemide (LASIX) injection 40 mg  40 mg IntraVENous DAILY    lisinopriL (PRINIVIL, ZESTRIL) tablet 20 mg  20 mg Oral DAILY    metoprolol succinate (TOPROL-XL) XL tablet 50 mg  50 mg Oral DAILY    aspirin delayed-release tablet 81 mg  81 mg Oral DAILY    enoxaparin (LOVENOX) injection 40 mg  40 mg SubCUTAneous Q24H    acetaminophen (TYLENOL) tablet 650 mg  650 mg Oral Q6H PRN               Lab/Data Reviewed:       Recent Labs     02/21/22  0000 02/20/22  0241 02/19/22  0148   WBC 7.4 8.5 9.3   HGB 12.2 12.3 11.3*   HCT 39.9 41.0 37.1    291 306     Recent Labs     02/21/22  0000 02/20/22  0241 02/19/22  0148    141 141   K 4.1 4.5 3.9    106 106   CO2 25 28 25   * 121* 180*   BUN 21* 21* 17   CREA 0.73 0.82 0.94   CA 10.5* 10.4* 9.9       Signed By: Mesha Zavala MD     February 21, 2022

## 2022-02-21 NOTE — PROGRESS NOTES
Chart reviewed, pt still requiring medical management for new onset CHF, cm will cont to review and remain available for d/c planning and offer [facility] prior to discharge.

## 2022-02-21 NOTE — PROGRESS NOTES
1700-DC instructions reviewed. No questions/concerns at this time. 1- Pt ambulated with RN to main entrance and left with family.

## 2022-02-22 NOTE — DISCHARGE SUMMARY
Discharge Summary    Patient: Lynette Pope MRN: [id number]  CSN: [id number]    YOB: 1965  Age: 49 y.o. Sex: female    DOA: 2/18/2022 LOS:  LOS: 3 days   Discharge Date: 2/21/2022     Primary Care Provider:  Belgica Kohler NP    Admission Diagnoses: New onset of congestive heart failure ([facility] [I50.9]  Hypertensive crisis [I16.9]  Elevated troponin [R77.8]    Discharge Diagnoses:    Problem List as of 2/21/2022 Never Reviewed          Codes Class Noted - Resolved    Elevated troponin ICD-10-CM: R77.8  ICD-9-CM: 790.6  2/18/2022 - Present        * (Principal) Systolic CHF, acute on chronic (HCC) ICD-10-CM: I50.23  ICD-9-CM: 428.23, 428.0  2/18/2022 - Present        RESOLVED: Hypertensive crisis ICD-10-CM: I16.9  ICD-9-CM: 401.9  2/18/2022 - 2/21/2022              Discharge Medications:     Discharge Medication List as of 2/21/2022  5:05 PM      START taking these medications    Details   aspirin delayed-release 81 mg tablet Take 1 Tablet by mouth daily. , Normal, Disp-30 Tablet, R-2      lisinopriL (PRINIVIL, ZESTRIL) 20 mg tablet Take 1 Tablet by mouth daily. , Normal, Disp-30 Tablet, R-2      metoprolol succinate (TOPROL-XL) 50 mg XL tablet Take 1 Tablet by mouth daily. , Normal, Disp-30 Tablet, R-2      furosemide (Lasix) 40 mg tablet Take 1 Tablet by mouth daily. , Normal, Disp-30 Tablet, R-2             Discharge Condition: Good    Procedures : None    Consults: Cardiology      PHYSICAL EXAM   Visit Vitals  /74   Pulse 85   Temp 98.2 °F (36.8 °C)   Resp 16   Ht 5' 7\" (1.702 m)   Wt 86.2 kg (190 lb)   SpO2 100%   BMI 29.76 kg/m²     General: Awake, cooperative, no acute distress    HEENT: NC, Atraumatic. PERRLA, EOMI. Anicteric sclerae. Lungs:  CTA Bilaterally. No Wheezing/Rhonchi/Rales. Heart:  Regular  rhythm,  No murmur, No Rubs, No Gallops  Abdomen: Soft, Non distended, Non tender. +Bowel sounds,   Extremities: No c/c/e  Psych:   Not anxious or agitated.   Neurologic:  No acute neurological deficits. Admission HPI :   Kareem Lainez is a 49 y.o. female with hypertension presents to ER with concerns of shortness of breath. Patient reports that she woke up in the middle of the night at 1 AM with cough and shortness of breath. She was also feeling hot. She denies any chest pain, palpitations, nausea, vomiting. No diaphoresis. She denies any history of heart failure in the past.  However upon reviewing records she was admitted to [facility] in 2018 for elevated blood pressure and CHF. At that time her echocardiogram showed EF of 25%. She has seen cardiology and PCP on and off. She reports she has not been taking any medications for the past 4 months. Denies any lower extremity edema. In ER she was noted to have elevated blood pressure at 190/121. She was tachycardic at 111. Her labs show elevated NT proBNP at 1007, high-sensitivity troponin at 95. CTA chest showed possible CHF pattern with cardiomegaly, bilateral pulmonary consolidations groundglass opacities, in lobular septal thickening and small effusions. Hospital Course :   Ms. Mikie Perez was admitted to monitored floor, she was seen and followed by cardiology. She did not had any acute events during hospitalization. Hypertensive crisis-  She was started on low-dose lisinopril and metoprolol succinate. Her blood pressure monitored. Medications gradually increased. Her blood pressure now better controlled. Acute on chronic systolic CHF-  She was started on IV diuresis, placed on fluid and sodium restrictions. As mentioned above she was placed on ACE inhibitor and beta-blocker. Echocardiogram showed EF of 30 to 35%. She is cleared from cardiology standpoint to be discharged home. Cardiology recommended discharging on aspirin, Lasix, lisinopril, metoprolol succinate. Starting on spironolactone to be decided on outpatient basis.     Patient extensively counseled on compliance on her diet and medication and follow-up with physicians. I have explained to her in clear terms that she has congestive heart failure and I used my fist to explain what congestive heart failure is and I also explained to her that her ejection fraction is 30 to 35% and normal is 55 and above. Also emphasized importance of follow-up with cardiologist.    Activity: Activity as tolerated    Diet: Cardiac Diet    Follow-up: PCP, cardiology    Disposition: home    Minutes spent on discharge: 45       Labs: Results:       Chemistry Recent Labs     02/21/22  0000 02/20/22  0241 02/19/22  0148   * 121* 180*    141 141   K 4.1 4.5 3.9    106 106   CO2 25 28 25   BUN 21* 21* 17   CREA 0.73 0.82 0.94   CA 10.5* 10.4* 9.9   AGAP 7 7 10   BUCR 29* 26* 18    104 100   TP 7.6 8.0 7.5   ALB 3.4 3.7 3.4   GLOB 4.2* 4.3* 4.1*   AGRAT 0.8 0.9 0.8      CBC w/Diff Recent Labs     02/21/22  0000 02/20/22  0241 02/19/22  0148   WBC 7.4 8.5 9.3   RBC 4.52 4.46 4.13*   HGB 12.2 12.3 11.3*   HCT 39.9 41.0 37.1    291 306   GRANS 61 62  --    LYMPH 28 25  --    EOS 1 2  --       Cardiac Enzymes No results for input(s): CPK, CKND1, PARTH in the last 72 hours. No lab exists for component: CKRMB, TROIP   Coagulation No results for input(s): PTP, INR, APTT, INREXT, INREXT in the last 72 hours. Lipid Panel No results found for: CHOL, CHOLPOCT, CHOLX, CHLST, CHOLV, 762090, HDL, HDLP, LDL, LDLC, DLDLP, 892963, VLDLC, VLDL, TGLX, TRIGL, TRIGP, TGLPOCT, CHHD, CHHDX   BNP No results for input(s): BNPP in the last 72 hours.    Liver Enzymes Recent Labs     02/21/22  0000   TP 7.6   ALB 3.4         Thyroid Studies Lab Results   Component Value Date/Time    TSH 2.69 02/18/2022 04:09 AM            Significant Diagnostic Studies: CTA CHEST W OR W WO CONT    Result Date: 2/18/2022  EXAM: CTA chest CLINICAL INDICATION/HISTORY: Respiratory distress COMPARISON: None TECHNIQUE: Axial CT imaging from the thoracic inlet through the diaphragm with intravenous contrast. Coronal and sagittal MIP reformats were generated. One or more dose reduction techniques were used on this CT: automated exposure control, adjustment of the mAs and/or kVp according to patient size, and iterative reconstruction techniques. The specific techniques used on this CT exam have been documented in the patient's electronic medical record. Digital Imaging and Communications in Medicine (DICOM) format image data are available to nonaffiliated external healthcare facilities or entities on a secured, media free, reciprocally searchable basis with patient authorization for at least a 12-month period after this study. _______________ FINDINGS: EXAM QUALITY: Adequate PULMONARY ARTERIES: No pulmonary embolism identified. MEDIASTINUM: Heart is enlarged. Aorta is unremarkable. No pericardial effusion. LUNGS: Patchy bilateral pulmonary consolidations and groundglass opacities with basilar predominant interlobular septal thickening. No suspicious nodules or masses. AIRWAY: Normal. PLEURA: Small pleural effusions. LYMPH NODES: No enlarged nodes. UPPER ABDOMEN: Hepatic steatosis. BONES: No acute or aggressive osseous abnormalities identified. OTHER: None. _______________     1. No pulmonary embolism identified. 2. Possible CHF pattern with cardiomegaly, bilateral pulmonary consolidations, groundglass opacities, interlobular septal thickening, and small effusions. Pneumonia is also possible in the appropriate clinical setting. XR CHEST PORT    Result Date: 2/18/2022  EXAM: CHEST RADIOGRAPH CLINICAL INDICATION/HISTORY: SOB/HTN/tachy   > Additional: None COMPARISON: None TECHNIQUE: Portable frontal view of the chest _______________ FINDINGS: SUPPORT DEVICES: None. HEART AND MEDIASTINUM: Heart is enlarged. LUNGS AND PLEURAL SPACES: Patchy bilateral pulmonary opacities and interstitial edema. No pneumothorax. BONES AND SOFT TISSUES: Unremarkable. _______________     Possible CHF with cardiomegaly, interstitial edema, and patchy pulmonary opacities. Pneumonia is also possible in the appropriate clinical setting. ECHO ADULT COMPLETE    Result Date: 2/20/2022    Left Ventricle: Left ventricle is dilated. Normal wall thickness. Global hypokinesis present. Reduced left ventricular systolic function with a visually estimated EF of 30 - 35%. Grade I diastolic dysfunction with normal LAP. No results found for this or any previous visit. Please note that this dictation was completed with [employer], the [employer] voice recognition software. Quite often unanticipated grammatical, syntax, homophones, and other interpretive errors are inadvertently transcribed by the computer software. Please disregard these errors. Please excuse any errors that have escaped final proofreading.      CC: Wendy Pinto NP